# Patient Record
Sex: MALE | Race: WHITE | NOT HISPANIC OR LATINO | Employment: OTHER | URBAN - METROPOLITAN AREA
[De-identification: names, ages, dates, MRNs, and addresses within clinical notes are randomized per-mention and may not be internally consistent; named-entity substitution may affect disease eponyms.]

---

## 2023-02-02 PROBLEM — K43.2 INCISIONAL HERNIA, WITHOUT OBSTRUCTION OR GANGRENE: Status: ACTIVE | Noted: 2023-02-02

## 2023-02-02 PROBLEM — L40.4 PSORIASIS, GUTTATE: Status: ACTIVE | Noted: 2023-02-02

## 2023-02-02 PROBLEM — H00.019 HORDEOLUM EXTERNAL: Status: ACTIVE | Noted: 2023-02-02

## 2023-02-02 PROBLEM — M62.08 RECTUS DIASTASIS: Status: ACTIVE | Noted: 2023-02-02

## 2023-02-02 PROBLEM — M77.32 CALCANEAL SPUR OF LEFT FOOT: Status: ACTIVE | Noted: 2023-02-02

## 2023-02-02 PROBLEM — G25.0 TREMOR, ESSENTIAL: Status: ACTIVE | Noted: 2023-02-02

## 2023-02-02 PROBLEM — G57.02 PIRIFORMIS SYNDROME, LEFT: Status: ACTIVE | Noted: 2023-02-02

## 2023-02-02 PROBLEM — M79.673 FOOT PAIN: Status: ACTIVE | Noted: 2023-02-02

## 2023-02-02 PROBLEM — E78.5 HLD (HYPERLIPIDEMIA): Status: ACTIVE | Noted: 2023-02-02

## 2023-02-02 PROBLEM — N52.9 ED (ERECTILE DYSFUNCTION): Status: ACTIVE | Noted: 2023-02-02

## 2023-02-02 PROBLEM — K42.9 UMBILICAL HERNIA: Status: ACTIVE | Noted: 2023-02-02

## 2023-02-02 PROBLEM — M54.50 LUMBAR BACK PAIN: Status: ACTIVE | Noted: 2023-02-02

## 2023-02-02 PROBLEM — R12 HEARTBURN: Status: ACTIVE | Noted: 2023-02-02

## 2023-02-02 PROBLEM — E78.1 HYPERTRIGLYCERIDEMIA: Status: ACTIVE | Noted: 2023-02-02

## 2023-02-02 PROBLEM — M72.2 PLANTAR FASCIITIS, LEFT: Status: ACTIVE | Noted: 2023-02-02

## 2023-02-02 PROBLEM — S93.402A SPRAIN OF ANKLE, LEFT: Status: ACTIVE | Noted: 2023-02-02

## 2023-02-02 PROBLEM — R25.1 TREMOR: Status: ACTIVE | Noted: 2023-02-02

## 2023-02-02 RX ORDER — ATORVASTATIN CALCIUM 20 MG/1
1 TABLET, FILM COATED ORAL DAILY
COMMUNITY
Start: 2018-11-30 | End: 2023-03-16 | Stop reason: SDUPTHER

## 2023-02-02 RX ORDER — PROPRANOLOL HYDROCHLORIDE 60 MG/1
1 CAPSULE, EXTENDED RELEASE ORAL DAILY
COMMUNITY
Start: 2022-03-15 | End: 2023-03-16 | Stop reason: SDUPTHER

## 2023-02-02 RX ORDER — HYDROGEN PEROXIDE 3 %
SOLUTION, NON-ORAL MISCELLANEOUS
COMMUNITY
Start: 2018-11-30

## 2023-02-02 RX ORDER — DICLOFENAC SODIUM 75 MG/1
1 TABLET, DELAYED RELEASE ORAL DAILY
COMMUNITY
Start: 2022-10-04 | End: 2023-03-16 | Stop reason: SDUPTHER

## 2023-03-16 ENCOUNTER — OFFICE VISIT (OUTPATIENT)
Dept: PRIMARY CARE | Facility: CLINIC | Age: 68
End: 2023-03-16
Payer: MEDICARE

## 2023-03-16 VITALS
WEIGHT: 297 LBS | HEIGHT: 78 IN | TEMPERATURE: 97.7 F | RESPIRATION RATE: 18 BRPM | HEART RATE: 77 BPM | BODY MASS INDEX: 34.36 KG/M2 | DIASTOLIC BLOOD PRESSURE: 84 MMHG | SYSTOLIC BLOOD PRESSURE: 128 MMHG

## 2023-03-16 DIAGNOSIS — G25.0 TREMOR, ESSENTIAL: ICD-10-CM

## 2023-03-16 DIAGNOSIS — M72.2 PLANTAR FASCIITIS, LEFT: ICD-10-CM

## 2023-03-16 DIAGNOSIS — E78.5 HYPERLIPIDEMIA, UNSPECIFIED HYPERLIPIDEMIA TYPE: ICD-10-CM

## 2023-03-16 PROBLEM — M79.673 FOOT PAIN: Status: RESOLVED | Noted: 2023-02-02 | Resolved: 2023-03-16

## 2023-03-16 PROBLEM — H00.019 HORDEOLUM EXTERNAL: Status: RESOLVED | Noted: 2023-02-02 | Resolved: 2023-03-16

## 2023-03-16 PROBLEM — R12 HEARTBURN: Status: RESOLVED | Noted: 2023-02-02 | Resolved: 2023-03-16

## 2023-03-16 PROBLEM — S93.402A SPRAIN OF ANKLE, LEFT: Status: RESOLVED | Noted: 2023-02-02 | Resolved: 2023-03-16

## 2023-03-16 PROBLEM — M54.50 LUMBAR BACK PAIN: Status: RESOLVED | Noted: 2023-02-02 | Resolved: 2023-03-16

## 2023-03-16 PROBLEM — R25.1 TREMOR: Status: RESOLVED | Noted: 2023-02-02 | Resolved: 2023-03-16

## 2023-03-16 PROBLEM — K42.9 UMBILICAL HERNIA: Status: RESOLVED | Noted: 2023-02-02 | Resolved: 2023-03-16

## 2023-03-16 PROCEDURE — 1036F TOBACCO NON-USER: CPT | Performed by: FAMILY MEDICINE

## 2023-03-16 PROCEDURE — 1160F RVW MEDS BY RX/DR IN RCRD: CPT | Performed by: FAMILY MEDICINE

## 2023-03-16 PROCEDURE — 99214 OFFICE O/P EST MOD 30 MIN: CPT | Performed by: FAMILY MEDICINE

## 2023-03-16 PROCEDURE — 1159F MED LIST DOCD IN RCRD: CPT | Performed by: FAMILY MEDICINE

## 2023-03-16 PROCEDURE — 1157F ADVNC CARE PLAN IN RCRD: CPT | Performed by: FAMILY MEDICINE

## 2023-03-16 RX ORDER — DICLOFENAC SODIUM 75 MG/1
75 TABLET, DELAYED RELEASE ORAL DAILY
Qty: 90 TABLET | Refills: 1 | Status: SHIPPED | OUTPATIENT
Start: 2023-03-16 | End: 2023-03-16 | Stop reason: ALTCHOICE

## 2023-03-16 RX ORDER — PROPRANOLOL HYDROCHLORIDE 60 MG/1
60 CAPSULE, EXTENDED RELEASE ORAL DAILY
Qty: 90 CAPSULE | Refills: 1 | Status: SHIPPED | OUTPATIENT
Start: 2023-03-16 | End: 2023-03-21 | Stop reason: SDUPTHER

## 2023-03-16 RX ORDER — ATORVASTATIN CALCIUM 20 MG/1
20 TABLET, FILM COATED ORAL DAILY
Qty: 90 TABLET | Refills: 1 | Status: SHIPPED | OUTPATIENT
Start: 2023-03-16 | End: 2023-03-21 | Stop reason: SDUPTHER

## 2023-03-16 ASSESSMENT — PATIENT HEALTH QUESTIONNAIRE - PHQ9
2. FEELING DOWN, DEPRESSED OR HOPELESS: NOT AT ALL
1. LITTLE INTEREST OR PLEASURE IN DOING THINGS: NOT AT ALL
SUM OF ALL RESPONSES TO PHQ9 QUESTIONS 1 AND 2: 0

## 2023-03-16 NOTE — PROGRESS NOTES
Bandar Castellanos Jr. is a 67 y.o. male here today for 6 month follow up HLD and tremor.        HLD recheck -- Patient taking medications correctly.  No SE's of muscle pain or joint pain.  No CP, edema, myalgias.    Essential tremor-this has been stable with current medications.  Still with some trouble with writing.  Much improved.  Variable.      Moles - not changing but asking me to check his back to make sure there are no dangerous skin lesions.    Left plantar fasciitis -he previously saw Dr. Reyes in podiatry.  He says this has been pretty stable and he still wears the inserts in his shoes.  He occasionally uses topical Voltaren gel.  He is no longer taking the oral diclofenac.      Objective    Visit Vitals  /84   Pulse 77   Temp 36.5 °C (97.7 °F)   Resp 18       Physical Exam   General - Not in acute distress and cooperative.  Build & Nutrition - Well developed  Posture - Normal  Gait - Normal  Mental Status - alert and oriented x 3    Head - Normocephalic    Neck - Thyroid normal size    Eyes - Bilateral - Sclera clear and lids pink without edema or mass.      Skin - Warm and dry with no rashes on visible skin.  Patient has a number of pigmented skin lesions on his back but none have any dangerous signs to warrant a biopsy or dermatology referral.    Lungs - Clear to auscultation and normal breathing effort    Cardiovascular - RRR and no murmurs, rubs or thrill.    Peripheral Vascular - Bilateral - no edema present    Neuropsychiatric - normal mood and affect       Assessment      Assess/Plan SmartLinks: Diagnoses and all orders for this visit:  Hyperlipidemia, unspecified hyperlipidemia type  Plantar fasciitis, left  Tremor, essential    The patient's chronic conditions are stable.  I will refill his atorvastatin and propranolol at the previous doses.  Appropriate labs ordered or reviewed.  He can continue to use the Voltaren gel on his plantar fasciitis as needed.  No change in current treatment  regimen.  Make a follow up appointment with me for recheck in 6 months.

## 2023-03-21 DIAGNOSIS — G25.0 TREMOR, ESSENTIAL: ICD-10-CM

## 2023-03-21 DIAGNOSIS — E78.5 HYPERLIPIDEMIA, UNSPECIFIED HYPERLIPIDEMIA TYPE: ICD-10-CM

## 2023-03-21 RX ORDER — ATORVASTATIN CALCIUM 20 MG/1
20 TABLET, FILM COATED ORAL DAILY
Qty: 90 TABLET | Refills: 1 | Status: SHIPPED | OUTPATIENT
Start: 2023-03-21 | End: 2023-10-12 | Stop reason: SDUPTHER

## 2023-03-21 RX ORDER — PROPRANOLOL HYDROCHLORIDE 60 MG/1
60 CAPSULE, EXTENDED RELEASE ORAL DAILY
Qty: 90 CAPSULE | Refills: 1 | Status: SHIPPED | OUTPATIENT
Start: 2023-03-21 | End: 2023-10-12 | Stop reason: SDUPTHER

## 2023-04-14 ENCOUNTER — LAB (OUTPATIENT)
Dept: LAB | Facility: LAB | Age: 68
End: 2023-04-14
Payer: MEDICARE

## 2023-04-14 DIAGNOSIS — G25.0 TREMOR, ESSENTIAL: ICD-10-CM

## 2023-04-14 DIAGNOSIS — E78.5 HYPERLIPIDEMIA, UNSPECIFIED HYPERLIPIDEMIA TYPE: ICD-10-CM

## 2023-04-14 LAB
ALANINE AMINOTRANSFERASE (SGPT) (U/L) IN SER/PLAS: 34 U/L (ref 10–52)
ALBUMIN (G/DL) IN SER/PLAS: 4.5 G/DL (ref 3.4–5)
ALKALINE PHOSPHATASE (U/L) IN SER/PLAS: 59 U/L (ref 33–136)
ANION GAP IN SER/PLAS: 14 MMOL/L (ref 10–20)
ASPARTATE AMINOTRANSFERASE (SGOT) (U/L) IN SER/PLAS: 24 U/L (ref 9–39)
BILIRUBIN TOTAL (MG/DL) IN SER/PLAS: 1 MG/DL (ref 0–1.2)
CALCIUM (MG/DL) IN SER/PLAS: 9.7 MG/DL (ref 8.6–10.3)
CARBON DIOXIDE, TOTAL (MMOL/L) IN SER/PLAS: 28 MMOL/L (ref 21–32)
CHLORIDE (MMOL/L) IN SER/PLAS: 105 MMOL/L (ref 98–107)
CHOLESTEROL (MG/DL) IN SER/PLAS: 166 MG/DL (ref 0–199)
CHOLESTEROL IN HDL (MG/DL) IN SER/PLAS: 32.4 MG/DL
CHOLESTEROL/HDL RATIO: 5.1
CREATININE (MG/DL) IN SER/PLAS: 1.05 MG/DL (ref 0.5–1.3)
ERYTHROCYTE DISTRIBUTION WIDTH (RATIO) BY AUTOMATED COUNT: 13.3 % (ref 11.5–14.5)
ERYTHROCYTE MEAN CORPUSCULAR HEMOGLOBIN CONCENTRATION (G/DL) BY AUTOMATED: 33.3 G/DL (ref 32–36)
ERYTHROCYTE MEAN CORPUSCULAR VOLUME (FL) BY AUTOMATED COUNT: 90 FL (ref 80–100)
ERYTHROCYTES (10*6/UL) IN BLOOD BY AUTOMATED COUNT: 5.15 X10E12/L (ref 4.5–5.9)
GFR MALE: 77 ML/MIN/1.73M2
GLUCOSE (MG/DL) IN SER/PLAS: 94 MG/DL (ref 74–99)
HEMATOCRIT (%) IN BLOOD BY AUTOMATED COUNT: 46.5 % (ref 41–52)
HEMOGLOBIN (G/DL) IN BLOOD: 15.5 G/DL (ref 13.5–17.5)
LDL: 68 MG/DL (ref 0–99)
LEUKOCYTES (10*3/UL) IN BLOOD BY AUTOMATED COUNT: 5.1 X10E9/L (ref 4.4–11.3)
NON HDL CHOLESTEROL: 134 MG/DL
PLATELETS (10*3/UL) IN BLOOD AUTOMATED COUNT: 236 X10E9/L (ref 150–450)
POTASSIUM (MMOL/L) IN SER/PLAS: 4.9 MMOL/L (ref 3.5–5.3)
PROTEIN TOTAL: 6.8 G/DL (ref 6.4–8.2)
SODIUM (MMOL/L) IN SER/PLAS: 142 MMOL/L (ref 136–145)
TRIGLYCERIDE (MG/DL) IN SER/PLAS: 330 MG/DL (ref 0–149)
UREA NITROGEN (MG/DL) IN SER/PLAS: 17 MG/DL (ref 6–23)
VLDL: 66 MG/DL (ref 0–40)

## 2023-04-14 PROCEDURE — 36415 COLL VENOUS BLD VENIPUNCTURE: CPT

## 2023-04-14 PROCEDURE — 85027 COMPLETE CBC AUTOMATED: CPT

## 2023-04-14 PROCEDURE — 80053 COMPREHEN METABOLIC PANEL: CPT

## 2023-04-14 PROCEDURE — 80061 LIPID PANEL: CPT

## 2023-04-17 NOTE — RESULT ENCOUNTER NOTE
Inform patient of normal results of all recent labs.   Even though some of the lab values may fall outside of the normal range, I do not feel they are clinically concerning or relevant at this time.

## 2023-04-18 ENCOUNTER — TELEPHONE (OUTPATIENT)
Dept: PRIMARY CARE | Facility: CLINIC | Age: 68
End: 2023-04-18
Payer: MEDICARE

## 2023-04-18 NOTE — TELEPHONE ENCOUNTER
----- Message from René Mckenna MD sent at 4/17/2023  7:15 AM EDT -----  Inform patient of normal results of all recent labs.   Even though some of the lab values may fall outside of the normal range, I do not feel they are clinically concerning or relevant at this time.

## 2023-07-12 ENCOUNTER — OFFICE VISIT (OUTPATIENT)
Dept: PRIMARY CARE | Facility: CLINIC | Age: 68
End: 2023-07-12
Payer: MEDICARE

## 2023-07-12 VITALS
BODY MASS INDEX: 34.71 KG/M2 | HEIGHT: 78 IN | WEIGHT: 300 LBS | HEART RATE: 74 BPM | RESPIRATION RATE: 20 BRPM | SYSTOLIC BLOOD PRESSURE: 104 MMHG | TEMPERATURE: 97 F | DIASTOLIC BLOOD PRESSURE: 72 MMHG | OXYGEN SATURATION: 96 %

## 2023-07-12 DIAGNOSIS — I80.3 PHLEBITIS OF LEG, RIGHT: Primary | ICD-10-CM

## 2023-07-12 PROBLEM — M47.817 LUMBOSACRAL SPONDYLOSIS WITHOUT MYELOPATHY: Status: ACTIVE | Noted: 2020-01-07

## 2023-07-12 PROCEDURE — 1157F ADVNC CARE PLAN IN RCRD: CPT | Performed by: FAMILY MEDICINE

## 2023-07-12 PROCEDURE — 1036F TOBACCO NON-USER: CPT | Performed by: FAMILY MEDICINE

## 2023-07-12 PROCEDURE — 1159F MED LIST DOCD IN RCRD: CPT | Performed by: FAMILY MEDICINE

## 2023-07-12 PROCEDURE — 1160F RVW MEDS BY RX/DR IN RCRD: CPT | Performed by: FAMILY MEDICINE

## 2023-07-12 PROCEDURE — 99214 OFFICE O/P EST MOD 30 MIN: CPT | Performed by: FAMILY MEDICINE

## 2023-07-12 NOTE — PROGRESS NOTES
"Bandar Castellanos is a 67 y.o. male here today for   Chief Complaint   Patient presents with    Hospital Follow-up      [facility] for phlebitis of right distal leg.  US showed thrombophlebitis of distal greater saphenous vein.  Was put on Keflex and IB because the ER doctor thought there could also be an overlying cellulitis.  Foot improved now.  The swelling of calf has improved.  He says the main area of tenderness was directly over the medial malleolus but this is no longer tender.  He has no previous history of phlebitis or thrombosis.  There was no injury or other conditions which would increase his risk for phlebitis.    Has also been getting some possible hives of buttocks and upper legs over the 3 months.   He says it has been very sporadic and it may be linked to mowing the lawn.  When he gets these lesions they last for a day or 2 and then resolve.  Today he has not of these lesions.  Was told it may be a reaction to Voltaren and IB together by urgent care doctor.          Current Outpatient Medications:     atorvastatin (Lipitor) 20 mg tablet, Take 1 tablet (20 mg) by mouth once daily., Disp: 90 tablet, Rfl: 1    esomeprazole (NexIUM) 20 mg DR capsule, Take by mouth., Disp: , Rfl:     magnesium oxide-Mg AA chelate 300 mg capsule, Take by mouth., Disp: , Rfl:     multivitamin capsule, Take by mouth., Disp: , Rfl:     propranolol LA (Inderal LA) 60 mg 24 hr capsule, Take 1 capsule (60 mg) by mouth once daily., Disp: 90 capsule, Rfl: 1    Patient Active Problem List   Diagnosis    Calcaneal spur of left foot    ED (erectile dysfunction)    HLD (hyperlipidemia)    Hypertriglyceridemia    Incisional hernia, without obstruction or gangrene    Piriformis syndrome, left    Psoriasis, guttate    Tremor, essential    Rectus diastasis    Lumbosacral spondylosis without myelopathy              Objective    Visit Vitals  /72   Pulse 74   Temp 36.1 °C (97 °F)   Resp 20   Ht 2.007 m (6' 7\")   Wt 136 kg (300 lb)   " SpO2 96%   BMI 33.80 kg/m²     Body mass index is 33.8 kg/m².     Physical Exam   Right leg-patient shows me a picture from a few days ago which shows an area of erythema around the medial malleolus.  This has now completely resolved.  There is no tenderness on exam.  There is no significant calf swelling or tenderness.    Assessment    1. Phlebitis of leg, right     The ultrasound obtained in the emergency room did not show a DVT but showed phlebitis of the distal greater saphenous vein.  The patient was not started on anticoagulants.  It is now improved significantly.  I recommend that he continue with the same measures from the emergency room and monitor closely.  If there is any worsening or recurrence of symptoms he should call us right away or go to the emergency room.     The patient also is reporting occasional hives or some type of transient rash as above.  I recommend that he monitor and make an appointment when he has the rash so I can further assess it.

## 2023-09-12 ENCOUNTER — LAB (OUTPATIENT)
Dept: LAB | Facility: LAB | Age: 68
End: 2023-09-12
Payer: MEDICARE

## 2023-09-12 ENCOUNTER — OFFICE VISIT (OUTPATIENT)
Dept: PRIMARY CARE | Facility: CLINIC | Age: 68
End: 2023-09-12
Payer: MEDICARE

## 2023-09-12 VITALS
TEMPERATURE: 97.1 F | RESPIRATION RATE: 18 BRPM | WEIGHT: 289 LBS | SYSTOLIC BLOOD PRESSURE: 130 MMHG | DIASTOLIC BLOOD PRESSURE: 82 MMHG | BODY MASS INDEX: 33.44 KG/M2 | HEIGHT: 78 IN | HEART RATE: 96 BPM

## 2023-09-12 DIAGNOSIS — M62.08 RECTUS DIASTASIS: ICD-10-CM

## 2023-09-12 DIAGNOSIS — Z12.5 SCREENING FOR PROSTATE CANCER: ICD-10-CM

## 2023-09-12 DIAGNOSIS — Z00.00 ROUTINE GENERAL MEDICAL EXAMINATION AT HEALTH CARE FACILITY: Primary | ICD-10-CM

## 2023-09-12 DIAGNOSIS — K43.2 INCISIONAL HERNIA, WITHOUT OBSTRUCTION OR GANGRENE: ICD-10-CM

## 2023-09-12 DIAGNOSIS — Z11.4 SCREENING FOR HIV WITHOUT PRESENCE OF RISK FACTORS: ICD-10-CM

## 2023-09-12 DIAGNOSIS — Z23 ENCOUNTER FOR IMMUNIZATION: ICD-10-CM

## 2023-09-12 DIAGNOSIS — Z11.59 NEED FOR HEPATITIS C SCREENING TEST: ICD-10-CM

## 2023-09-12 PROBLEM — E78.2 MIXED HYPERLIPIDEMIA: Status: ACTIVE | Noted: 2023-02-02

## 2023-09-12 LAB — PROSTATE SPECIFIC ANTIGEN,SCREEN: 0.82 NG/ML (ref 0–4)

## 2023-09-12 PROCEDURE — 1160F RVW MEDS BY RX/DR IN RCRD: CPT | Performed by: FAMILY MEDICINE

## 2023-09-12 PROCEDURE — 86803 HEPATITIS C AB TEST: CPT

## 2023-09-12 PROCEDURE — G0439 PPPS, SUBSEQ VISIT: HCPCS | Performed by: FAMILY MEDICINE

## 2023-09-12 PROCEDURE — 90662 IIV NO PRSV INCREASED AG IM: CPT | Performed by: FAMILY MEDICINE

## 2023-09-12 PROCEDURE — 1159F MED LIST DOCD IN RCRD: CPT | Performed by: FAMILY MEDICINE

## 2023-09-12 PROCEDURE — 1157F ADVNC CARE PLAN IN RCRD: CPT | Performed by: FAMILY MEDICINE

## 2023-09-12 PROCEDURE — 1036F TOBACCO NON-USER: CPT | Performed by: FAMILY MEDICINE

## 2023-09-12 PROCEDURE — 87389 HIV-1 AG W/HIV-1&-2 AB AG IA: CPT

## 2023-09-12 PROCEDURE — 99213 OFFICE O/P EST LOW 20 MIN: CPT | Performed by: FAMILY MEDICINE

## 2023-09-12 PROCEDURE — 1170F FXNL STATUS ASSESSED: CPT | Performed by: FAMILY MEDICINE

## 2023-09-12 PROCEDURE — G0008 ADMIN INFLUENZA VIRUS VAC: HCPCS | Performed by: FAMILY MEDICINE

## 2023-09-12 PROCEDURE — G0103 PSA SCREENING: HCPCS

## 2023-09-12 PROCEDURE — G0444 DEPRESSION SCREEN ANNUAL: HCPCS | Performed by: FAMILY MEDICINE

## 2023-09-12 PROCEDURE — 36415 COLL VENOUS BLD VENIPUNCTURE: CPT

## 2023-09-12 ASSESSMENT — ACTIVITIES OF DAILY LIVING (ADL)
DRESSING: INDEPENDENT
DOING_HOUSEWORK: INDEPENDENT
MANAGING_FINANCES: INDEPENDENT
GROCERY_SHOPPING: INDEPENDENT
BATHING: INDEPENDENT
TAKING_MEDICATION: INDEPENDENT

## 2023-09-12 ASSESSMENT — PATIENT HEALTH QUESTIONNAIRE - PHQ9
SUM OF ALL RESPONSES TO PHQ9 QUESTIONS 1 AND 2: 0
1. LITTLE INTEREST OR PLEASURE IN DOING THINGS: NOT AT ALL
2. FEELING DOWN, DEPRESSED OR HOPELESS: NOT AT ALL

## 2023-09-12 NOTE — PROGRESS NOTES
History Of Present Illness  Bandar Castellanos is a 67 y.o. male presenting for a Medicare Annual Wellness Exam.  Patient is here for a periodic health exam.  I reviewed previous preventative health measures including screening tests, immunizations and labs.  I reviewed screenings administered by my staff today.    Umbilical hernia - he saw Dr. Buck last year and he ordered a CT but the patient never got it done.  I reviewed the surgeons note and he definitely wanted to get a CT done to help decide if and when surgery should be done.  The patient says that he still has bulging around the umbilicus with straining and he is not sure if it is getting worse or bigger.  No episodes of incarceration symptoms on review.     PREVIOUS PREVENTATIVE HEALTH  Colonoscopy : No  Cologuard : Yes    Date: 8/23/2022  Prostate cancer screening with PSA : Yes    Date: 8/23/2022  Hepatitis C Antibody : No  Prevnar : Yes    Date: 6/22/2020 Prevnar 13  Shingrix : Yes    Date: Times 2/20/2022  Tdap within 10 years : Yes  Yearly Flu Shot : Yes    CURRENT FINDINGS  Falls : No  Problems with ADL's :  No  Healthy Diet : Yes  Exercise :  No  Vision or Hearing Problems : No  Depression Issues : No  Alcohol Use : No  Tobacco Use : No         Past Medical History  Patient Active Problem List   Diagnosis    Calcaneal spur of left foot    ED (erectile dysfunction)    Mixed hyperlipidemia    Hypertriglyceridemia    Incisional hernia, without obstruction or gangrene    Piriformis syndrome, left    Psoriasis, guttate    Tremor, essential    Rectus diastasis    Lumbosacral spondylosis without myelopathy       Past Surgical History:   Procedure Laterality Date    OTHER SURGICAL HISTORY  11/30/2018    Colonoscopy        Current Outpatient Medications:     atorvastatin (Lipitor) 20 mg tablet, Take 1 tablet (20 mg) by mouth once daily., Disp: 90 tablet, Rfl: 1    esomeprazole (NexIUM) 20 mg DR capsule, Take by mouth., Disp: , Rfl:     magnesium oxide-Mg AA  chelate 300 mg capsule, Take by mouth., Disp: , Rfl:     multivitamin capsule, Take by mouth., Disp: , Rfl:     propranolol LA (Inderal LA) 60 mg 24 hr capsule, Take 1 capsule (60 mg) by mouth once daily., Disp: 90 capsule, Rfl: 1   Immunization History   Administered Date(s) Administered    Flu vaccine, quadrivalent, high-dose, preservative free, age 65y+ (FLUZONE) 09/12/2023    Influenza, seasonal, injectable 12/21/2020    Pfizer COVID-19 vaccine, bivalent, age 12 years and older (30 mcg/0.3 mL) 11/25/2022    Pfizer Purple Cap SARS-CoV-2 03/05/2021, 03/26/2021, 10/07/2021    Pneumococcal conjugate vaccine, 13-valent (PREVNAR 13) 06/22/2020    Pneumococcal polysaccharide vaccine, 23-valent, age 2 years and older (PNEUMOVAX 23) 11/02/2017    SARS-CoV-2, Unspecified 05/12/2022    Td vaccine, age 7 years and older (TDVAX) 11/02/2017    Zoster vaccine, recombinant, adult (SHINGRIX) 12/21/2020, 03/14/2022        Social History  Social History     Socioeconomic History    Marital status:      Spouse name: Not on file    Number of children: Not on file    Years of education: Not on file    Highest education level: Not on file   Occupational History    Not on file   Tobacco Use    Smoking status: Never    Smokeless tobacco: Never   Substance and Sexual Activity    Alcohol use: Not on file    Drug use: Not on file    Sexual activity: Not on file   Other Topics Concern    Not on file   Social History Narrative    Not on file     Social Determinants of Health     Financial Resource Strain: Not on file   Food Insecurity: Not on file   Transportation Needs: Not on file   Physical Activity: Not on file   Stress: Not on file   Social Connections: Not on file   Intimate Partner Violence: Not on file   Housing Stability: Not on file        has no history on file for alcohol use.    reports that he has never smoked. He has never used smokeless tobacco.    has no history on file for drug use.          "  Allergies  Fenofibrate      Physical Exam   height is 2.007 m (6' 7\") and weight is 131 kg (289 lb). His temperature is 36.2 °C (97.1 °F). His blood pressure is 130/82 and his pulse is 96. His respiration is 18.    Physical Exam  Vitals and nursing note reviewed.   Constitutional:       General: He is not in acute distress.     Appearance: Normal appearance.   HENT:      Head: Normocephalic and atraumatic.      Right Ear: Tympanic membrane, ear canal and external ear normal.      Left Ear: Tympanic membrane, ear canal and external ear normal.      Nose: Nose normal.      Mouth/Throat:      Mouth: Mucous membranes are moist.      Pharynx: Oropharynx is clear.   Eyes:      Extraocular Movements: Extraocular movements intact.      Conjunctiva/sclera: Conjunctivae normal.      Pupils: Pupils are equal, round, and reactive to light.   Cardiovascular:      Rate and Rhythm: Normal rate and regular rhythm.      Pulses: Normal pulses.      Heart sounds: Normal heart sounds. No murmur heard.     No friction rub. No gallop.   Pulmonary:      Effort: Pulmonary effort is normal. No respiratory distress.      Breath sounds: Normal breath sounds.   Abdominal:      General: Abdomen is flat. Bowel sounds are normal. There is no distension.      Palpations: Abdomen is soft.      Tenderness: There is no abdominal tenderness.   Musculoskeletal:         General: Normal range of motion.      Cervical back: Normal range of motion and neck supple.   Lymphadenopathy:      Cervical: No cervical adenopathy.   Skin:     General: Skin is warm and dry.      Findings: No lesion or rash.   Neurological:      General: No focal deficit present.      Mental Status: He is alert. Mental status is at baseline.   Psychiatric:         Mood and Affect: Mood normal.         Behavior: Behavior normal.         Thought Content: Thought content normal.         Judgment: Judgment normal.        Abdomen-there is a umbilical hernia in place with no evidence of " contents currently.  There is mild tenderness to palpation.         Assessment      1. Routine general medical examination at health care facility  1 Year Follow Up In [facility] - PCP - Wellness Exam      2. Encounter for immunization  Flu vaccine, quadrivalent, high-dose, preservative free, age 65y+ (FLUZONE)      3. Incisional hernia, without obstruction or gangrene  Referral to General Surgery   Patient has a umbilical hernia and diastases recti.  I recommend that he see a general surgeon for recheck and to get advice surgical repair.  We discussed the danger of waiting and that the hernia will likely progress and become more difficult to surgically correct over time.     4. Rectus diastasis  Referral to General Surgery      5. Need for hepatitis C screening test  Hepatitis C antibody      6. Screening for HIV without presence of risk factors  HIV-1 and HIV-2 antibodies      7. Screening for prostate cancer  Prostate Specific Antigen, Screen           I recommend regular exercise, balanced diet, regular dental exams, and healthy habits.  Patient denies depression sxs.  Patient is able to perform all ADL's without assistance.  I reminded patient to get yearly eye exams with glaucoma screening.  I recommend to eat plenty of plant foods (such as whole-grain products, fruits, and vegetables) and a moderate amount of lean and low-fat, animal-based food (meat and dairy products).  When shopping, choose lean meats, fish, and poultry. I recommend to try to get regular aerobic exercise.  I recommend a yearly flu shot in the fall and I recommend a yearly wellness exam.      Ordered today:  PSA, Hepatitis C Antibody, HIV Screen             René Mckenna MD

## 2023-09-13 ENCOUNTER — TELEPHONE (OUTPATIENT)
Dept: PRIMARY CARE | Facility: CLINIC | Age: 68
End: 2023-09-13
Payer: MEDICARE

## 2023-09-13 LAB
HEPATITIS C VIRUS AB PRESENCE IN SERUM: NONREACTIVE
HIV 1/ 2 AG/AB SCREEN: NONREACTIVE

## 2023-10-12 ENCOUNTER — OFFICE VISIT (OUTPATIENT)
Dept: PRIMARY CARE | Facility: CLINIC | Age: 68
End: 2023-10-12
Payer: MEDICARE

## 2023-10-12 VITALS
HEART RATE: 92 BPM | TEMPERATURE: 97.3 F | HEIGHT: 78 IN | WEIGHT: 290 LBS | DIASTOLIC BLOOD PRESSURE: 78 MMHG | RESPIRATION RATE: 18 BRPM | BODY MASS INDEX: 33.55 KG/M2 | SYSTOLIC BLOOD PRESSURE: 120 MMHG

## 2023-10-12 DIAGNOSIS — E78.2 MIXED HYPERLIPIDEMIA: Primary | ICD-10-CM

## 2023-10-12 DIAGNOSIS — G25.0 TREMOR, ESSENTIAL: ICD-10-CM

## 2023-10-12 DIAGNOSIS — E78.1 HYPERTRIGLYCERIDEMIA: ICD-10-CM

## 2023-10-12 PROCEDURE — 1159F MED LIST DOCD IN RCRD: CPT | Performed by: FAMILY MEDICINE

## 2023-10-12 PROCEDURE — 1160F RVW MEDS BY RX/DR IN RCRD: CPT | Performed by: FAMILY MEDICINE

## 2023-10-12 PROCEDURE — 1036F TOBACCO NON-USER: CPT | Performed by: FAMILY MEDICINE

## 2023-10-12 PROCEDURE — 99213 OFFICE O/P EST LOW 20 MIN: CPT | Performed by: FAMILY MEDICINE

## 2023-10-12 RX ORDER — MAGNESIUM 250 MG
TABLET ORAL 2 TIMES DAILY
COMMUNITY

## 2023-10-12 RX ORDER — IBUPROFEN 400 MG/1
400 TABLET ORAL EVERY 6 HOURS PRN
COMMUNITY
End: 2024-04-09 | Stop reason: ALTCHOICE

## 2023-10-12 RX ORDER — PROPRANOLOL HYDROCHLORIDE 60 MG/1
60 CAPSULE, EXTENDED RELEASE ORAL DAILY
Qty: 90 CAPSULE | Refills: 1 | Status: SHIPPED | OUTPATIENT
Start: 2023-10-12 | End: 2024-04-09 | Stop reason: ALTCHOICE

## 2023-10-12 RX ORDER — GLUCOSAM/CHONDRO/HERB 149/HYAL 750-100 MG
TABLET ORAL
COMMUNITY

## 2023-10-12 RX ORDER — ATORVASTATIN CALCIUM 20 MG/1
20 TABLET, FILM COATED ORAL DAILY
Qty: 90 TABLET | Refills: 1 | Status: SHIPPED | OUTPATIENT
Start: 2023-10-12 | End: 2024-04-09 | Stop reason: SDUPTHER

## 2023-10-12 NOTE — PROGRESS NOTES
"Bandar Castellanos is a 67 y.o. male here today for 6 month follow up.   Chief Complaint   Patient presents with    Tremors    Hyperlipidemia        HPI   HLD recheck -- Patient taking medications correctly.  No SE's of muscle pain or joint pain.  No CP, edema, myalgias.    Recheck tremors- Well controlled with propranolol.  No SE's.     Had right leg phlebitis in July 2023 - no DVT so no anticoagulant started.  Had been taking IB for this and sxs have completely resolved.  He is no longer taking ibuprofen daily and he stopped it about 1 month ago.      Current Outpatient Medications:     esomeprazole (NexIUM) 20 mg DR capsule, Take by mouth., Disp: , Rfl:     ibuprofen (IBU) 400 mg tablet, Take 1 tablet (400 mg) by mouth every 6 hours if needed for moderate pain (4 - 6)., Disp: , Rfl:     multivitamin capsule, Take by mouth., Disp: , Rfl:     omega 3-dha-epa-fish oil (Fish OiL) 1,000 mg (120 mg-180 mg) capsule, Take by mouth., Disp: , Rfl:     atorvastatin (Lipitor) 20 mg tablet, Take 1 tablet (20 mg) by mouth once daily., Disp: 90 tablet, Rfl: 1    magnesium 250 mg tablet, Take by mouth 2 times a day., Disp: , Rfl:     propranolol LA (Inderal LA) 60 mg 24 hr capsule, Take 1 capsule (60 mg) by mouth once daily., Disp: 90 capsule, Rfl: 1    Patient Active Problem List   Diagnosis    Calcaneal spur of left foot    ED (erectile dysfunction)    Mixed hyperlipidemia    Hypertriglyceridemia    Incisional hernia, without obstruction or gangrene    Piriformis syndrome, left    Psoriasis, guttate    Tremor, essential    Rectus diastasis    Lumbosacral spondylosis without myelopathy         No results found for this or any previous visit (from the past 672 hour(s)).     Objective    Visit Vitals  /78   Pulse 92   Temp 36.3 °C (97.3 °F)   Resp 18   Ht 2.007 m (6' 7\")   Wt 132 kg (290 lb)   BMI 32.67 kg/m²     Body mass index is 32.67 kg/m².     Physical Exam   General - Not in acute distress and cooperative.  Build " & Nutrition - Well developed  Posture - Normal  Gait - Normal  Mental Status - alert and oriented x 3    Head - Normocephalic    Neck - Thyroid normal size    Eyes - Bilateral - Sclera clear and lids pink without edema or mass.      Skin - Warm and dry with no rashes on visible skin    Lungs - Clear to auscultation and normal breathing effort    Cardiovascular - RRR and no murmurs, rubs or thrill.    Peripheral Vascular - Bilateral - no edema present    Neuropsychiatric - normal mood and affect        Assessment    1. Mixed hyperlipidemia  atorvastatin (Lipitor) 20 mg tablet   Condition well controlled.  No change in current treatment regimen.  Refill given of current medication.  Appropriate labs ordered or reviewed.  Make a follow up appointment with me for recheck in 6 months.     2. Tremor, essential  propranolol LA (Inderal LA) 60 mg 24 hr capsule   Condition well controlled.  No change in current treatment regimen.  Refill given of current medication.  Make a follow up appointment with me for recheck in 6 months.     3. Hypertriglyceridemia  atorvastatin (Lipitor) 20 mg tablet   Condition well controlled.  No change in current treatment regimen.  Refill given of current medication.  Appropriate labs ordered or reviewed.  Make a follow up appointment with me for recheck in 6 months.

## 2023-10-19 ENCOUNTER — APPOINTMENT (OUTPATIENT)
Dept: SURGERY | Facility: CLINIC | Age: 68
End: 2023-10-19
Payer: MEDICARE

## 2024-04-09 ENCOUNTER — OFFICE VISIT (OUTPATIENT)
Dept: PRIMARY CARE | Facility: CLINIC | Age: 69
End: 2024-04-09
Payer: MEDICARE

## 2024-04-09 VITALS
SYSTOLIC BLOOD PRESSURE: 128 MMHG | HEART RATE: 86 BPM | TEMPERATURE: 97 F | WEIGHT: 302 LBS | RESPIRATION RATE: 20 BRPM | BODY MASS INDEX: 34.94 KG/M2 | DIASTOLIC BLOOD PRESSURE: 90 MMHG | HEIGHT: 78 IN

## 2024-04-09 DIAGNOSIS — D22.9 MULTIPLE NEVI: ICD-10-CM

## 2024-04-09 DIAGNOSIS — R13.19 ESOPHAGEAL DYSPHAGIA: ICD-10-CM

## 2024-04-09 DIAGNOSIS — E78.1 HYPERTRIGLYCERIDEMIA: ICD-10-CM

## 2024-04-09 DIAGNOSIS — G25.0 TREMOR, ESSENTIAL: ICD-10-CM

## 2024-04-09 DIAGNOSIS — K21.9 GASTROESOPHAGEAL REFLUX DISEASE WITHOUT ESOPHAGITIS: Primary | ICD-10-CM

## 2024-04-09 DIAGNOSIS — E78.2 MIXED HYPERLIPIDEMIA: ICD-10-CM

## 2024-04-09 PROCEDURE — 1159F MED LIST DOCD IN RCRD: CPT | Performed by: FAMILY MEDICINE

## 2024-04-09 PROCEDURE — 99214 OFFICE O/P EST MOD 30 MIN: CPT | Performed by: FAMILY MEDICINE

## 2024-04-09 PROCEDURE — G2211 COMPLEX E/M VISIT ADD ON: HCPCS | Performed by: FAMILY MEDICINE

## 2024-04-09 PROCEDURE — 1160F RVW MEDS BY RX/DR IN RCRD: CPT | Performed by: FAMILY MEDICINE

## 2024-04-09 PROCEDURE — 1157F ADVNC CARE PLAN IN RCRD: CPT | Performed by: FAMILY MEDICINE

## 2024-04-09 PROCEDURE — 3074F SYST BP LT 130 MM HG: CPT | Performed by: FAMILY MEDICINE

## 2024-04-09 PROCEDURE — 1036F TOBACCO NON-USER: CPT | Performed by: FAMILY MEDICINE

## 2024-04-09 PROCEDURE — 3080F DIAST BP >= 90 MM HG: CPT | Performed by: FAMILY MEDICINE

## 2024-04-09 RX ORDER — PRIMIDONE 50 MG/1
25 TABLET ORAL NIGHTLY
Qty: 15 TABLET | Refills: 6 | Status: SHIPPED | OUTPATIENT
Start: 2024-04-09

## 2024-04-09 RX ORDER — ATORVASTATIN CALCIUM 20 MG/1
20 TABLET, FILM COATED ORAL DAILY
Qty: 90 TABLET | Refills: 1 | Status: CANCELLED | OUTPATIENT
Start: 2024-04-09

## 2024-04-09 RX ORDER — ATORVASTATIN CALCIUM 20 MG/1
20 TABLET, FILM COATED ORAL DAILY
Qty: 90 TABLET | Refills: 1 | Status: SHIPPED | OUTPATIENT
Start: 2024-04-09

## 2024-04-09 NOTE — PROGRESS NOTES
Bandar Castellanos is a 68 y.o. male here today for   Chief Complaint   Patient presents with    Hyperlipidemia        Hyperlipidemia  This is a chronic problem. The current episode started more than 1 year ago.      HLD recheck -- Patient taking medications correctly.  No SE's of muscle pain or joint pain.  No CP, edema, myalgias.      Recheck Tremor - Condition not well controlled with Propranolol.  It does seem to help but not as well controlled as he would like.  He asks if there are other options to try.  He did see a neurologist some years ago for this and was diagnosed with a essential tremor.    He has lots of moles.  Wife says many on back and maybe changing.  FH of skin cancer in dad - unsure of type.      He takes Nexium every 2-3 days OTC prn.  No sxs if he takes it.  He has had a few episodes of food sticking.  He says there has been many times when food has gotten stuck and he nearly had to go to the emergency room.  He denies any severe epigastric pain or chest pain.  He has noticed that he needs to eat smaller bites than he used to.          Current Outpatient Medications:     esomeprazole (NexIUM) 20 mg DR capsule, Take by mouth., Disp: , Rfl:     magnesium 250 mg tablet, Take by mouth 2 times a day., Disp: , Rfl:     multivitamin capsule, Take by mouth., Disp: , Rfl:     omega 3-dha-epa-fish oil (Fish OiL) 1,000 mg (120 mg-180 mg) capsule, Take by mouth., Disp: , Rfl:     atorvastatin (Lipitor) 20 mg tablet, Take 1 tablet (20 mg) by mouth once daily., Disp: 90 tablet, Rfl: 1    primidone (Mysoline) 50 mg tablet, Take 0.5 tablets (25 mg) by mouth once daily at bedtime., Disp: 15 tablet, Rfl: 6    Patient Active Problem List   Diagnosis    Calcaneal spur of left foot    ED (erectile dysfunction)    Mixed hyperlipidemia    Hypertriglyceridemia    Incisional hernia, without obstruction or gangrene    Piriformis syndrome, left    Psoriasis, guttate    Tremor, essential    Rectus diastasis    Lumbosacral  "spondylosis without myelopathy    Sebaceous cyst    Obesity, unspecified    Primary hypertension    Esophageal dysphagia    Gastroesophageal reflux disease without esophagitis    Multiple nevi         No results found for this or any previous visit (from the past 672 hour(s)).     Objective    Visit Vitals    Visit Vitals  /90   Pulse 86   Temp 36.1 °C (97 °F)   Resp 20   Ht 2.007 m (6' 7\")   Wt 137 kg (302 lb)   BMI 34.02 kg/m²   Smoking Status Former   BSA 2.76 m²       Body mass index is 34.02 kg/m².     Physical Exam   General - Not in acute distress and cooperative.  Build & Nutrition - Well developed  Posture - Normal  Gait - Normal  Mental Status - alert and oriented x 3    Head - Normocephalic    Neck - Thyroid normal size    Eyes - Bilateral - Sclera clear and lids pink without edema or mass.      Skin -patient has numerous nevi of his back and a few of his chest and abdomen.    Lungs - Clear to auscultation and normal breathing effort    Cardiovascular - RRR and no murmurs, rubs or thrill.    Peripheral Vascular - Bilateral - no edema present    Neuropsychiatric - normal mood and affect        Assessment    1. Gastroesophageal reflux disease without esophagitis  Referral to Gastroenterology   He has a long history of reflux for which she takes over-the-counter Nexium.  He is now having some episodes of food sticking as above.  We discussed this may mean that he is developing a esophageal stricture and I will refer him to gastroenterology for further evaluation.     2. Mixed hyperlipidemia  atorvastatin (Lipitor) 20 mg tablet, Comprehensive Metabolic Panel, CBC, Lipid Panel   Condition well controlled.  No change in current treatment regimen.  Refill given of current medication.  Appropriate labs ordered or reviewed.  Make a follow up appointment with me for recheck in 6 months.       3. Hypertriglyceridemia  atorvastatin (Lipitor) 20 mg tablet, Comprehensive Metabolic Panel, CBC, Lipid Panel "   Condition well controlled.  No change in current treatment regimen.  Refill given of current medication.  Appropriate labs ordered or reviewed.  Make a follow up appointment with me for recheck in 6 months.       4. Esophageal dysphagia  Referral to Gastroenterology      5. Tremor, essential  primidone (Mysoline) 50 mg tablet   Suboptimal control of condition.  Will modify treatment by changing from propranolol to primidone.  He will take primidone 25 mg at bedtime.  If he feels like things are not well-controlled in 1 month he should make a follow-up appointment to discuss further.     6. Multiple nevi  Referral to Dermatology   He does have multiple nevi of his trunk and none have an immediately dangerous appearance.  Since there is a family history of skin cancer I would like him to have a full body skin exam and I placed a referral to dermatology.         Orders Placed This Encounter      atorvastatin (Lipitor) 20 mg tablet      primidone (Mysoline) 50 mg tablet       Orders Placed This Encounter   Procedures    Comprehensive Metabolic Panel    CBC    Lipid Panel    Referral to Gastroenterology    Referral to Dermatology        New Medications Ordered This Visit   Medications    primidone (Mysoline) 50 mg tablet     Sig: Take 0.5 tablets (25 mg) by mouth once daily at bedtime.     Dispense:  15 tablet     Refill:  6    atorvastatin (Lipitor) 20 mg tablet     Sig: Take 1 tablet (20 mg) by mouth once daily.     Dispense:  90 tablet     Refill:  1

## 2024-04-15 ENCOUNTER — LAB (OUTPATIENT)
Dept: LAB | Facility: LAB | Age: 69
End: 2024-04-15
Payer: MEDICARE

## 2024-04-15 DIAGNOSIS — E78.1 HYPERTRIGLYCERIDEMIA: ICD-10-CM

## 2024-04-15 DIAGNOSIS — E78.2 MIXED HYPERLIPIDEMIA: ICD-10-CM

## 2024-04-15 LAB
ALBUMIN SERPL BCP-MCNC: 4.2 G/DL (ref 3.4–5)
ALP SERPL-CCNC: 53 U/L (ref 33–136)
ALT SERPL W P-5'-P-CCNC: 27 U/L (ref 10–52)
ANION GAP SERPL CALC-SCNC: 11 MMOL/L (ref 10–20)
AST SERPL W P-5'-P-CCNC: 18 U/L (ref 9–39)
BILIRUB SERPL-MCNC: 0.6 MG/DL (ref 0–1.2)
BUN SERPL-MCNC: 13 MG/DL (ref 6–23)
CALCIUM SERPL-MCNC: 9.1 MG/DL (ref 8.6–10.3)
CHLORIDE SERPL-SCNC: 104 MMOL/L (ref 98–107)
CHOLEST SERPL-MCNC: 180 MG/DL (ref 0–199)
CHOLESTEROL/HDL RATIO: 4.9
CO2 SERPL-SCNC: 31 MMOL/L (ref 21–32)
CREAT SERPL-MCNC: 0.94 MG/DL (ref 0.5–1.3)
EGFRCR SERPLBLD CKD-EPI 2021: 88 ML/MIN/1.73M*2
ERYTHROCYTE [DISTWIDTH] IN BLOOD BY AUTOMATED COUNT: 13.5 % (ref 11.5–14.5)
GLUCOSE SERPL-MCNC: 90 MG/DL (ref 74–99)
HCT VFR BLD AUTO: 44.8 % (ref 41–52)
HDLC SERPL-MCNC: 37 MG/DL
HGB BLD-MCNC: 15.5 G/DL (ref 13.5–17.5)
LDLC SERPL CALC-MCNC: 71 MG/DL
MCH RBC QN AUTO: 30.7 PG (ref 26–34)
MCHC RBC AUTO-ENTMCNC: 34.6 G/DL (ref 32–36)
MCV RBC AUTO: 89 FL (ref 80–100)
NON HDL CHOLESTEROL: 143 MG/DL (ref 0–149)
NRBC BLD-RTO: 0 /100 WBCS (ref 0–0)
PLATELET # BLD AUTO: 195 X10*3/UL (ref 150–450)
POTASSIUM SERPL-SCNC: 4.6 MMOL/L (ref 3.5–5.3)
PROT SERPL-MCNC: 6.7 G/DL (ref 6.4–8.2)
RBC # BLD AUTO: 5.05 X10*6/UL (ref 4.5–5.9)
SODIUM SERPL-SCNC: 141 MMOL/L (ref 136–145)
TRIGL SERPL-MCNC: 358 MG/DL (ref 0–149)
VLDL: 72 MG/DL (ref 0–40)
WBC # BLD AUTO: 4.9 X10*3/UL (ref 4.4–11.3)

## 2024-04-15 PROCEDURE — 36415 COLL VENOUS BLD VENIPUNCTURE: CPT

## 2024-04-15 PROCEDURE — 80053 COMPREHEN METABOLIC PANEL: CPT

## 2024-04-15 PROCEDURE — 80061 LIPID PANEL: CPT

## 2024-04-15 PROCEDURE — 85027 COMPLETE CBC AUTOMATED: CPT

## 2024-06-03 ENCOUNTER — OFFICE VISIT (OUTPATIENT)
Dept: GASTROENTEROLOGY | Facility: CLINIC | Age: 69
End: 2024-06-03
Payer: MEDICARE

## 2024-06-03 VITALS
HEART RATE: 87 BPM | SYSTOLIC BLOOD PRESSURE: 134 MMHG | OXYGEN SATURATION: 95 % | HEIGHT: 78 IN | BODY MASS INDEX: 34.59 KG/M2 | WEIGHT: 299 LBS | DIASTOLIC BLOOD PRESSURE: 90 MMHG

## 2024-06-03 DIAGNOSIS — R13.19 ESOPHAGEAL DYSPHAGIA: ICD-10-CM

## 2024-06-03 DIAGNOSIS — K21.9 GASTROESOPHAGEAL REFLUX DISEASE WITHOUT ESOPHAGITIS: ICD-10-CM

## 2024-06-03 PROCEDURE — 1157F ADVNC CARE PLAN IN RCRD: CPT | Performed by: NURSE PRACTITIONER

## 2024-06-03 PROCEDURE — 3075F SYST BP GE 130 - 139MM HG: CPT | Performed by: NURSE PRACTITIONER

## 2024-06-03 PROCEDURE — 3080F DIAST BP >= 90 MM HG: CPT | Performed by: NURSE PRACTITIONER

## 2024-06-03 PROCEDURE — 1159F MED LIST DOCD IN RCRD: CPT | Performed by: NURSE PRACTITIONER

## 2024-06-03 PROCEDURE — 1036F TOBACCO NON-USER: CPT | Performed by: NURSE PRACTITIONER

## 2024-06-03 PROCEDURE — 99214 OFFICE O/P EST MOD 30 MIN: CPT | Performed by: NURSE PRACTITIONER

## 2024-06-03 RX ORDER — OMEPRAZOLE 40 MG/1
40 CAPSULE, DELAYED RELEASE ORAL
Qty: 30 CAPSULE | Refills: 2 | Status: SHIPPED | OUTPATIENT
Start: 2024-06-03 | End: 2025-06-03

## 2024-06-03 NOTE — PROGRESS NOTES
Assessment/Plan   Bandar Castellanos is a 68 y.o. male with PMH significant for HTN, HLD, obesity, psoriasis who presents to GI clinic for long history of reflux for which he has been taking over-the-counter Nexium 20 mg p.o.3 times a week x 5 years.  Now reporting episodes of food sticking in his esophagus and a few episodes of food (rice, noodles) getting lodged in his esophagus.  Denies routine NSAID use.    GERD  Esophageal dysphagia     Recommend omeprazole 40 mg po once daily, take 30 minutes before 1st meal  Antireflux lifestyle  Avoid NSAIDs aspirin, Excedrin, Advil, Aleve, ibuprofen, Motrin, naproxen, Naprosyn  Recommend EGD +/- biopsies +/- dilation to be done at endoscopy center in [place]   Patient aware he will need a  the day of the procedure   Follow up with GI 2 weeks after EGD to review results     Antireflux lifestyle modifications   Avoid alcoholic beverages, Caffeine, carbonated beverages, smoking, trigger foods. Trigger foods include citrus fruits, chocolate, fatty and fried foods, garlic and onions, mint flavorings, spicy foods and tomato-based foods like spaghetti sauce, salsa, chili or pizza.  Avoid aspirin and NSAIDs if possible  Eat small frequent meals. Eat at least 2 hours prior to going to bed.   Try raising the head of the bed to prevent stomach acid from coming back up.   Recommend stress reduction, weight reduction, and physical exercise can help reduce symptoms of GERD.   Call or return to office if GERD symptoms become worse or if you have difficulty swallowing, pain with swallowing, are vomiting blood, notice black or bloody stools, or have unexplained weight loss.      Lidia Cook, APRN-CNP      Subjective     History of Present Illness:   Bandar Castellanos is a 68 y.o. male with PMH significant for HTN, HLD, obesity, psoriasis who presents to GI clinic for long history of reflux for which he has been taking over-the-counter Nexium >3 times a week.  Now  reporting episodes of food sticking in his esophagus and a few episodes of food getting lodged in his esophagus.    He takes Nexium 20 mg every 2-3 days.  Denies symptoms if he is taking medication, if he stops taking medication he will develop symptoms within 3 to 5 days.  Patient has been taking Nexium 3 times a week for 5 years.  He has had a few episodes of food sticking.  He says there has been many times when food has gotten stuck and he nearly had to go to the emergency room.  He denies any severe epigastric pain or chest pain.  He has noticed that he needs to eat smaller bites than he used to.     NSAIDs - Ibuprofen as needed 1x week.     Cologuard negative 2022.  Colonoscopy x2 negative/normal per patient, no h/o colon polyps.    Review of Systems  ROS Negative unless otherwise stated above.    Past Medical History   has a past medical history of Foot pain (2023), Heartburn (2023), Hordeolum external (2023), Lumbar back pain (2023), Other specified health status, Plantar fasciitis, left (2023), Sprain of ankle, left (2023), and Umbilical hernia (2023).     Social History   reports that he has quit smoking. His smoking use included cigarettes. He has never used smokeless tobacco. He reports current alcohol use. He reports that he does not use drugs.     Family History  family history includes Cancer in his father. Father lung cancer. Mother anemia, rectal bleeding ... Unknown colonoscopy result --  92. No family history of GI malignancies.     Allergies  Allergies   Allergen Reactions    Fenofibrate Unknown       Medications  Current Outpatient Medications   Medication Instructions    atorvastatin (LIPITOR) 20 mg, oral, Daily    esomeprazole (NexIUM) 20 mg DR capsule oral    magnesium 250 mg tablet oral, 2 times daily    multivitamin capsule oral    omega 3-dha-epa-fish oil (Fish OiL) 1,000 mg (120 mg-180 mg) capsule oral    primidone (MYSOLINE) 25 mg, oral,  Nightly        Objective   General: A&Ox3, NAD.  HEENT: AT/NC.   CV: RRR. No murmur.  Resp: CTA bilaterally. No wheezing, rhonchi or rales.   GI: Soft, NT/ND. BSx4.  Extrem: No edema. Pulses intact.  Skin: No Jaundice.   Neuro: No focal deficits.   Psych: Normal mood and affect.     Lab Results   Component Value Date    WBC 4.9 04/15/2024    WBC CANCELED 07/05/2023    WBC 5.1 04/14/2023    HGB 15.5 04/15/2024    HGB CANCELED 07/05/2023    HGB 15.5 04/14/2023    HCT 44.8 04/15/2024    HCT CANCELED 07/05/2023    HCT 46.5 04/14/2023     04/15/2024    PLT CANCELED 07/05/2023     04/14/2023     Lab Results   Component Value Date     04/15/2024    K 4.6 04/15/2024     04/15/2024    CO2 31 04/15/2024    BUN 13 04/15/2024    CREATININE 0.94 04/15/2024    GLUCOSE 90 04/15/2024    CALCIUM 9.1 04/15/2024       Lab Results   Component Value Date    ALKPHOS 53 04/15/2024    ALKPHOS 59 04/14/2023    ALKPHOS 56 08/23/2022    BILITOT 0.6 04/15/2024    BILITOT 1.0 04/14/2023    BILITOT 0.7 08/23/2022    PROT 6.7 04/15/2024    PROT 6.8 04/14/2023    PROT 6.8 08/23/2022    ALT 27 04/15/2024    ALT 34 04/14/2023    ALT 26 08/23/2022    AST 18 04/15/2024    AST 24 04/14/2023    AST 20 08/23/2022      Lab Results   Component Value Date    INR CANCELED 07/05/2023

## 2024-06-03 NOTE — H&P (VIEW-ONLY)
Assessment/Plan   Bandar Castellanos is a 68 y.o. male with PMH significant for HTN, HLD, obesity, psoriasis who presents to GI clinic for long history of reflux for which he has been taking over-the-counter Nexium 20 mg p.o.3 times a week x 5 years.  Now reporting episodes of food sticking in his esophagus and a few episodes of food (rice, noodles) getting lodged in his esophagus.  Denies routine NSAID use.    GERD  Esophageal dysphagia     Recommend omeprazole 40 mg po once daily, take 30 minutes before 1st meal  Antireflux lifestyle  Avoid NSAIDs aspirin, Excedrin, Advil, Aleve, ibuprofen, Motrin, naproxen, Naprosyn  Recommend EGD +/- biopsies +/- dilation to be done at endoscopy center in [place]   Patient aware he will need a  the day of the procedure   Follow up with GI 2 weeks after EGD to review results     Antireflux lifestyle modifications   Avoid alcoholic beverages, Caffeine, carbonated beverages, smoking, trigger foods. Trigger foods include citrus fruits, chocolate, fatty and fried foods, garlic and onions, mint flavorings, spicy foods and tomato-based foods like spaghetti sauce, salsa, chili or pizza.  Avoid aspirin and NSAIDs if possible  Eat small frequent meals. Eat at least 2 hours prior to going to bed.   Try raising the head of the bed to prevent stomach acid from coming back up.   Recommend stress reduction, weight reduction, and physical exercise can help reduce symptoms of GERD.   Call or return to office if GERD symptoms become worse or if you have difficulty swallowing, pain with swallowing, are vomiting blood, notice black or bloody stools, or have unexplained weight loss.      Lidia Cook, APRN-CNP      Subjective     History of Present Illness:   Bandar Castellanos is a 68 y.o. male with PMH significant for HTN, HLD, obesity, psoriasis who presents to GI clinic for long history of reflux for which he has been taking over-the-counter Nexium >3 times a week.  Now  reporting episodes of food sticking in his esophagus and a few episodes of food getting lodged in his esophagus.    He takes Nexium 20 mg every 2-3 days.  Denies symptoms if he is taking medication, if he stops taking medication he will develop symptoms within 3 to 5 days.  Patient has been taking Nexium 3 times a week for 5 years.  He has had a few episodes of food sticking.  He says there has been many times when food has gotten stuck and he nearly had to go to the emergency room.  He denies any severe epigastric pain or chest pain.  He has noticed that he needs to eat smaller bites than he used to.     NSAIDs - Ibuprofen as needed 1x week.     Cologuard negative 2022.  Colonoscopy x2 negative/normal per patient, no h/o colon polyps.    Review of Systems  ROS Negative unless otherwise stated above.    Past Medical History   has a past medical history of Foot pain (2023), Heartburn (2023), Hordeolum external (2023), Lumbar back pain (2023), Other specified health status, Plantar fasciitis, left (2023), Sprain of ankle, left (2023), and Umbilical hernia (2023).     Social History   reports that he has quit smoking. His smoking use included cigarettes. He has never used smokeless tobacco. He reports current alcohol use. He reports that he does not use drugs.     Family History  family history includes Cancer in his father. Father lung cancer. Mother anemia, rectal bleeding ... Unknown colonoscopy result --  92. No family history of GI malignancies.     Allergies  Allergies   Allergen Reactions    Fenofibrate Unknown       Medications  Current Outpatient Medications   Medication Instructions    atorvastatin (LIPITOR) 20 mg, oral, Daily    esomeprazole (NexIUM) 20 mg DR capsule oral    magnesium 250 mg tablet oral, 2 times daily    multivitamin capsule oral    omega 3-dha-epa-fish oil (Fish OiL) 1,000 mg (120 mg-180 mg) capsule oral    primidone (MYSOLINE) 25 mg, oral,  Nightly        Objective   General: A&Ox3, NAD.  HEENT: AT/NC.   CV: RRR. No murmur.  Resp: CTA bilaterally. No wheezing, rhonchi or rales.   GI: Soft, NT/ND. BSx4.  Extrem: No edema. Pulses intact.  Skin: No Jaundice.   Neuro: No focal deficits.   Psych: Normal mood and affect.     Lab Results   Component Value Date    WBC 4.9 04/15/2024    WBC CANCELED 07/05/2023    WBC 5.1 04/14/2023    HGB 15.5 04/15/2024    HGB CANCELED 07/05/2023    HGB 15.5 04/14/2023    HCT 44.8 04/15/2024    HCT CANCELED 07/05/2023    HCT 46.5 04/14/2023     04/15/2024    PLT CANCELED 07/05/2023     04/14/2023     Lab Results   Component Value Date     04/15/2024    K 4.6 04/15/2024     04/15/2024    CO2 31 04/15/2024    BUN 13 04/15/2024    CREATININE 0.94 04/15/2024    GLUCOSE 90 04/15/2024    CALCIUM 9.1 04/15/2024       Lab Results   Component Value Date    ALKPHOS 53 04/15/2024    ALKPHOS 59 04/14/2023    ALKPHOS 56 08/23/2022    BILITOT 0.6 04/15/2024    BILITOT 1.0 04/14/2023    BILITOT 0.7 08/23/2022    PROT 6.7 04/15/2024    PROT 6.8 04/14/2023    PROT 6.8 08/23/2022    ALT 27 04/15/2024    ALT 34 04/14/2023    ALT 26 08/23/2022    AST 18 04/15/2024    AST 24 04/14/2023    AST 20 08/23/2022      Lab Results   Component Value Date    INR CANCELED 07/05/2023

## 2024-06-10 ENCOUNTER — ANESTHESIA EVENT (OUTPATIENT)
Dept: GASTROENTEROLOGY | Facility: EXTERNAL LOCATION | Age: 69
End: 2024-06-10

## 2024-06-14 ENCOUNTER — APPOINTMENT (OUTPATIENT)
Dept: GASTROENTEROLOGY | Facility: EXTERNAL LOCATION | Age: 69
End: 2024-06-14
Payer: MEDICARE

## 2024-06-14 ENCOUNTER — ANESTHESIA (OUTPATIENT)
Dept: GASTROENTEROLOGY | Facility: EXTERNAL LOCATION | Age: 69
End: 2024-06-14

## 2024-06-14 VITALS
HEIGHT: 78 IN | WEIGHT: 299 LBS | RESPIRATION RATE: 18 BRPM | TEMPERATURE: 97.3 F | HEART RATE: 78 BPM | OXYGEN SATURATION: 96 % | SYSTOLIC BLOOD PRESSURE: 127 MMHG | BODY MASS INDEX: 34.59 KG/M2 | DIASTOLIC BLOOD PRESSURE: 78 MMHG

## 2024-06-14 DIAGNOSIS — K21.9 GASTROESOPHAGEAL REFLUX DISEASE WITHOUT ESOPHAGITIS: Primary | ICD-10-CM

## 2024-06-14 DIAGNOSIS — R13.19 ESOPHAGEAL DYSPHAGIA: ICD-10-CM

## 2024-06-14 PROCEDURE — 43239 EGD BIOPSY SINGLE/MULTIPLE: CPT | Performed by: INTERNAL MEDICINE

## 2024-06-14 RX ORDER — LIDOCAINE HYDROCHLORIDE 20 MG/ML
INJECTION, SOLUTION EPIDURAL; INFILTRATION; INTRACAUDAL; PERINEURAL AS NEEDED
Status: DISCONTINUED | OUTPATIENT
Start: 2024-06-14 | End: 2024-06-14

## 2024-06-14 RX ORDER — PROPOFOL 10 MG/ML
INJECTION, EMULSION INTRAVENOUS AS NEEDED
Status: DISCONTINUED | OUTPATIENT
Start: 2024-06-14 | End: 2024-06-14

## 2024-06-14 RX ORDER — SODIUM CHLORIDE, SODIUM LACTATE, POTASSIUM CHLORIDE, CALCIUM CHLORIDE 600; 310; 30; 20 MG/100ML; MG/100ML; MG/100ML; MG/100ML
20 INJECTION, SOLUTION INTRAVENOUS CONTINUOUS
Status: CANCELLED | OUTPATIENT
Start: 2024-06-14

## 2024-06-14 RX ORDER — SODIUM CHLORIDE, SODIUM LACTATE, POTASSIUM CHLORIDE, CALCIUM CHLORIDE 600; 310; 30; 20 MG/100ML; MG/100ML; MG/100ML; MG/100ML
INJECTION, SOLUTION INTRAVENOUS CONTINUOUS PRN
Status: DISCONTINUED | OUTPATIENT
Start: 2024-06-14 | End: 2024-06-14

## 2024-06-14 SDOH — HEALTH STABILITY: MENTAL HEALTH: CURRENT SMOKER: 0

## 2024-06-14 ASSESSMENT — PAIN SCALES - GENERAL
PAIN_LEVEL: 0
PAINLEVEL_OUTOF10: 0 - NO PAIN

## 2024-06-14 ASSESSMENT — PAIN - FUNCTIONAL ASSESSMENT
PAIN_FUNCTIONAL_ASSESSMENT: 0-10

## 2024-06-14 ASSESSMENT — COLUMBIA-SUICIDE SEVERITY RATING SCALE - C-SSRS

## 2024-06-14 NOTE — ANESTHESIA PREPROCEDURE EVALUATION
Patient: Bandar Castellanos    Procedure Information       Date/Time: 06/14/24 0900    Scheduled providers: Dominic Reynaga MD    Procedure: EGD    Location: [facility]            Relevant Problems   Cardiac   (+) Hypertriglyceridemia   (+) Mixed hyperlipidemia   (+) Primary hypertension      Neuro   (+) Piriformis syndrome, left      GI   (+) Esophageal dysphagia   (+) Gastroesophageal reflux disease without esophagitis      Endocrine   (+) Obesity, unspecified      Musculoskeletal   (+) Lumbosacral spondylosis without myelopathy      Skin   (+) Psoriasis, guttate       Clinical information reviewed:   Tobacco  Allergies  Meds   Med Hx  Surg Hx   Fam Hx  Soc Hx      Vitals:    06/14/24 0906   BP: 114/73   Pulse: 79   Resp: 20   SpO2: 95%       Past Surgical History:   Procedure Laterality Date    OTHER SURGICAL HISTORY  11/30/2018    Colonoscopy     Past Medical History:   Diagnosis Date    Foot pain 02/02/2023    GERD (gastroesophageal reflux disease)     Heartburn 02/02/2023    Hordeolum external 02/02/2023    Hyperlipidemia     Lumbar back pain 02/02/2023    Other specified health status     No pertinent past medical history    Plantar fasciitis, left 02/02/2023    Sprain of ankle, left 02/02/2023    Umbilical hernia 02/02/2023       Current Outpatient Medications:     atorvastatin (Lipitor) 20 mg tablet, Take 1 tablet (20 mg) by mouth once daily., Disp: 90 tablet, Rfl: 1    esomeprazole (NexIUM) 20 mg DR capsule, Take by mouth., Disp: , Rfl:     magnesium 250 mg tablet, Take by mouth 2 times a day., Disp: , Rfl:     multivitamin capsule, Take by mouth., Disp: , Rfl:     omega 3-dha-epa-fish oil (Fish OiL) 1,000 mg (120 mg-180 mg) capsule, Take by mouth., Disp: , Rfl:     omeprazole (PriLOSEC) 40 mg DR capsule, Take 1 capsule (40 mg) by mouth once daily in the morning. Take before meals. Do not crush or chew., Disp: 30 capsule, Rfl: 2    primidone (Mysoline) 50 mg tablet, Take 0.5 tablets (25  mg) by mouth once daily at bedtime., Disp: 15 tablet, Rfl: 6  Prior to Admission medications    Medication Sig Start Date End Date Taking? Authorizing Provider   atorvastatin (Lipitor) 20 mg tablet Take 1 tablet (20 mg) by mouth once daily. 4/9/24  Yes René Mckenna MD   esomeprazole (NexIUM) 20 mg DR capsule Take by mouth. 11/30/18  Yes Historical Provider, MD   magnesium 250 mg tablet Take by mouth 2 times a day.   Yes Historical Provider, MD   multivitamin capsule Take by mouth. 11/30/18  Yes Historical Provider, MD   omega 3-dha-epa-fish oil (Fish OiL) 1,000 mg (120 mg-180 mg) capsule Take by mouth.   Yes Historical Provider, MD   omeprazole (PriLOSEC) 40 mg DR capsule Take 1 capsule (40 mg) by mouth once daily in the morning. Take before meals. Do not crush or chew. 6/3/24 6/3/25 Yes COLIN Roque-CNP   primidone (Mysoline) 50 mg tablet Take 0.5 tablets (25 mg) by mouth once daily at bedtime. 4/9/24  Yes René Mckenna MD     Allergies   Allergen Reactions    Fenofibrate Unknown     Social History     Tobacco Use    Smoking status: Former     Types: Cigarettes    Smokeless tobacco: Never   Substance Use Topics    Alcohol use: Not Currently     Comment: rare         Chemistry    Lab Results   Component Value Date/Time     04/15/2024 1110    K 4.6 04/15/2024 1110     04/15/2024 1110    CO2 31 04/15/2024 1110    BUN 13 04/15/2024 1110    CREATININE 0.94 04/15/2024 1110    Lab Results   Component Value Date/Time    CALCIUM 9.1 04/15/2024 1110    ALKPHOS 53 04/15/2024 1110    AST 18 04/15/2024 1110    ALT 27 04/15/2024 1110    BILITOT 0.6 04/15/2024 1110          Lab Results   Component Value Date/Time    WBC 4.9 04/15/2024 1110    HGB 15.5 04/15/2024 1110    HCT 44.8 04/15/2024 1110     04/15/2024 1110     Lab Results   Component Value Date/Time    PROTIME CANCELED 07/05/2023 1237    INR CANCELED 07/05/2023 1237     No results found for this or any previous visit (from the past 4464  hour(s)).    NPO Detail:  NPO/Void Status  Date of Last Liquid: 06/13/24  Time of Last Liquid: 2100  Date of Last Solid: 06/14/24  Time of Last Solid: 2130  Last Intake Type: Clear fluids         Physical Exam    Airway  Mallampati: III  TM distance: >3 FB  Neck ROM: full     Cardiovascular - normal exam     Dental - normal exam     Pulmonary - normal exam     Abdominal - normal exam             Anesthesia Plan    History of general anesthesia?: yes  History of complications of general anesthesia?: no    ASA 3     MAC     The patient is not a current smoker.    intravenous induction   Anesthetic plan and risks discussed with patient.    Plan discussed with attending.

## 2024-06-14 NOTE — DISCHARGE INSTRUCTIONS

## 2024-06-14 NOTE — ANESTHESIA POSTPROCEDURE EVALUATION
Patient: Bandra Castellanos    Procedure Summary       Date: 06/14/24 Room / Location: [facility]    Anesthesia Start: 0922 Anesthesia Stop: 0933    Procedure: EGD Diagnosis:       Gastroesophageal reflux disease without esophagitis      Esophageal dysphagia    Scheduled Providers: Dominic Reynaga MD Responsible Provider: GAGE Sexton    Anesthesia Type: MAC ASA Status: 3            Anesthesia Type: MAC    Vitals Value Taken Time   /64 06/14/24 0933   Temp 36.3 °C (97.3 °F) 06/14/24 0933   Pulse 77 06/14/24 0933   Resp 18 06/14/24 0933   SpO2 95 % 06/14/24 0933       Anesthesia Post Evaluation    Patient location during evaluation: bedside  Patient participation: complete - patient participated  Level of consciousness: awake and alert  Pain score: 0  Pain management: adequate  Airway patency: patent  Cardiovascular status: acceptable  Respiratory status: acceptable  Hydration status: acceptable  Postoperative Nausea and Vomiting: none        No notable events documented.

## 2024-06-24 LAB
LABORATORY COMMENT REPORT: NORMAL
PATH REPORT.FINAL DX SPEC: NORMAL
PATH REPORT.GROSS SPEC: NORMAL
PATH REPORT.TOTAL CANCER: NORMAL

## 2024-09-09 ENCOUNTER — APPOINTMENT (OUTPATIENT)
Dept: PRIMARY CARE | Facility: CLINIC | Age: 69
End: 2024-09-09
Payer: MEDICARE

## 2024-09-09 VITALS
SYSTOLIC BLOOD PRESSURE: 122 MMHG | DIASTOLIC BLOOD PRESSURE: 80 MMHG | RESPIRATION RATE: 18 BRPM | WEIGHT: 299 LBS | TEMPERATURE: 98 F | HEIGHT: 78 IN | BODY MASS INDEX: 34.59 KG/M2 | HEART RATE: 67 BPM

## 2024-09-09 DIAGNOSIS — Z23 NEED FOR VACCINATION: ICD-10-CM

## 2024-09-09 DIAGNOSIS — Z12.5 SCREENING FOR PROSTATE CANCER: ICD-10-CM

## 2024-09-09 DIAGNOSIS — Z00.00 ROUTINE GENERAL MEDICAL EXAMINATION AT HEALTH CARE FACILITY: Primary | ICD-10-CM

## 2024-09-09 DIAGNOSIS — Z13.6 SCREENING FOR AAA (ABDOMINAL AORTIC ANEURYSM): ICD-10-CM

## 2024-09-09 PROBLEM — M77.30 CALCANEAL SPUR: Status: ACTIVE | Noted: 2024-09-09

## 2024-09-09 PROCEDURE — 3079F DIAST BP 80-89 MM HG: CPT | Performed by: FAMILY MEDICINE

## 2024-09-09 PROCEDURE — 90662 IIV NO PRSV INCREASED AG IM: CPT | Performed by: FAMILY MEDICINE

## 2024-09-09 PROCEDURE — G0008 ADMIN INFLUENZA VIRUS VAC: HCPCS | Performed by: FAMILY MEDICINE

## 2024-09-09 PROCEDURE — 3008F BODY MASS INDEX DOCD: CPT | Performed by: FAMILY MEDICINE

## 2024-09-09 PROCEDURE — 1157F ADVNC CARE PLAN IN RCRD: CPT | Performed by: FAMILY MEDICINE

## 2024-09-09 PROCEDURE — G0439 PPPS, SUBSEQ VISIT: HCPCS | Performed by: FAMILY MEDICINE

## 2024-09-09 PROCEDURE — 3074F SYST BP LT 130 MM HG: CPT | Performed by: FAMILY MEDICINE

## 2024-09-09 PROCEDURE — 1159F MED LIST DOCD IN RCRD: CPT | Performed by: FAMILY MEDICINE

## 2024-09-09 PROCEDURE — 1160F RVW MEDS BY RX/DR IN RCRD: CPT | Performed by: FAMILY MEDICINE

## 2024-09-09 PROCEDURE — 1158F ADVNC CARE PLAN TLK DOCD: CPT | Performed by: FAMILY MEDICINE

## 2024-09-09 PROCEDURE — 1170F FXNL STATUS ASSESSED: CPT | Performed by: FAMILY MEDICINE

## 2024-09-09 PROCEDURE — 1123F ACP DISCUSS/DSCN MKR DOCD: CPT | Performed by: FAMILY MEDICINE

## 2024-09-09 ASSESSMENT — ACTIVITIES OF DAILY LIVING (ADL)
TAKING_MEDICATION: INDEPENDENT
MANAGING_FINANCES: INDEPENDENT
DOING_HOUSEWORK: INDEPENDENT
DRESSING: INDEPENDENT
BATHING: INDEPENDENT
GROCERY_SHOPPING: INDEPENDENT

## 2024-09-09 NOTE — PROGRESS NOTES
History Of Present Illness  Bandar Castellanos is a 68 y.o. male presenting for a Medicare Annual Wellness Exam.  Patient is here for a periodic health exam.  I reviewed previous preventative health measures including screening tests, immunizations and labs.  I reviewed screenings administered by my staff today.       PREVIOUS PREVENTATIVE HEALTH    Colonoscopy : No  Cologuard : Yes    Date: 8/23/2022  Prostate cancer screening with PSA : Yes    Date: 9/12/2023  Hepatitis C Antibody : Yes    Date: 9/12/2023  Prevnar : Yes    Date: 6/22/2020  Shingrix : Yes    Date: 2020 and 2022  Tdap within 10 years : Yes  Yearly Flu Shot : Yes      CURRENT FINDINGS    Falls : No  Problems with ADL's :  No  Healthy Diet : Yes  Exercise :  Yes  Vision or Hearing Problems : No  Depression Issues : No  Alcohol Use : No  Tobacco Use : No.  Previous smoker.           Past Medical History  Patient Active Problem List   Diagnosis    Calcaneal spur of left foot    ED (erectile dysfunction)    Mixed hyperlipidemia    Hypertriglyceridemia    Incisional hernia, without obstruction or gangrene    Piriformis syndrome, left    Psoriasis, guttate    Tremor, essential    Rectus diastasis    Lumbosacral spondylosis without myelopathy    Sebaceous cyst    Obesity, unspecified    Primary hypertension    Esophageal dysphagia    Gastroesophageal reflux disease without esophagitis    Multiple nevi    Calcaneal spur       Past Surgical History:   Procedure Laterality Date    OTHER SURGICAL HISTORY  11/30/2018    Colonoscopy        Current Outpatient Medications:     atorvastatin (Lipitor) 20 mg tablet, Take 1 tablet (20 mg) by mouth once daily., Disp: 90 tablet, Rfl: 1    magnesium 250 mg tablet, Take by mouth 2 times a day., Disp: , Rfl:     multivitamin capsule, Take by mouth., Disp: , Rfl:     omega 3-dha-epa-fish oil (Fish OiL) 1,000 mg (120 mg-180 mg) capsule, Take by mouth., Disp: , Rfl:     primidone (Mysoline) 50 mg tablet, Take 0.5 tablets (25  mg) by mouth once daily at bedtime., Disp: 15 tablet, Rfl: 6    esomeprazole (NexIUM) 20 mg DR capsule, Take by mouth., Disp: , Rfl:     omeprazole (PriLOSEC) 40 mg DR capsule, Take 1 capsule (40 mg) by mouth once daily in the morning. Take before meals. Do not crush or chew., Disp: 30 capsule, Rfl: 0   Immunization History   Administered Date(s) Administered    Flu vaccine, quadrivalent, high-dose, preservative free, age 65y+ (FLUZONE) 09/12/2023    Flu vaccine, trivalent, preservative free, HIGH-DOSE, age 65y+ (Fluzone) 09/09/2024    Influenza, seasonal, injectable 12/21/2020    Pfizer COVID-19 vaccine, Fall 2023, 12 years and older, (30mcg/0.3mL) 11/11/2023    Pfizer COVID-19 vaccine, bivalent, age 12 years and older (30 mcg/0.3 mL) 11/25/2022    Pfizer Purple Cap SARS-CoV-2 03/05/2021, 03/26/2021, 10/07/2021    Pneumococcal conjugate vaccine, 13-valent (PREVNAR 13) 06/22/2020    Pneumococcal polysaccharide vaccine, 23-valent, age 2 years and older (PNEUMOVAX 23) 11/02/2017    SARS-CoV-2, Unspecified 05/12/2022    Td vaccine, age 7 years and older (TDVAX) 11/02/2017    Zoster vaccine, recombinant, adult (SHINGRIX) 12/21/2020, 03/14/2022        Social History  Social History     Socioeconomic History    Marital status:      Spouse name: Not on file    Number of children: Not on file    Years of education: Not on file    Highest education level: Not on file   Occupational History    Not on file   Tobacco Use    Smoking status: Former     Types: Cigarettes    Smokeless tobacco: Never   Vaping Use    Vaping status: Never Used   Substance and Sexual Activity    Alcohol use: Not Currently     Comment: rare    Drug use: Never    Sexual activity: Not on file   Other Topics Concern    Not on file   Social History Narrative    Not on file     Social Determinants of Health     Financial Resource Strain: Not on file   Food Insecurity: Not on file   Transportation Needs: Not on file   Physical Activity: Not on file  "  Stress: Not on file   Social Connections: Not on file   Intimate Partner Violence: Not on file   Housing Stability: Not on file        reports that he does not currently use alcohol.    reports that he has quit smoking. His smoking use included cigarettes. He has never used smokeless tobacco.    reports no history of drug use.           Allergies  Fenofibrate      Physical Exam   height is 2.007 m (6' 7\") and weight is 136 kg (299 lb). His temperature is 36.7 °C (98 °F). His blood pressure is 122/80 and his pulse is 67. His respiration is 18.    Physical Exam  Vitals and nursing note reviewed.   Constitutional:       General: He is not in acute distress.     Appearance: Normal appearance.   HENT:      Head: Normocephalic and atraumatic.      Right Ear: Tympanic membrane, ear canal and external ear normal.      Left Ear: Tympanic membrane, ear canal and external ear normal.      Nose: Nose normal.      Mouth/Throat:      Mouth: Mucous membranes are moist.      Pharynx: Oropharynx is clear.   Eyes:      Extraocular Movements: Extraocular movements intact.      Conjunctiva/sclera: Conjunctivae normal.      Pupils: Pupils are equal, round, and reactive to light.   Cardiovascular:      Rate and Rhythm: Normal rate and regular rhythm.      Pulses: Normal pulses.      Heart sounds: Normal heart sounds. No murmur heard.     No friction rub. No gallop.   Pulmonary:      Effort: Pulmonary effort is normal. No respiratory distress.      Breath sounds: Normal breath sounds.   Abdominal:      General: Abdomen is flat. Bowel sounds are normal. There is no distension.      Palpations: Abdomen is soft.      Tenderness: There is no abdominal tenderness.   Musculoskeletal:         General: Normal range of motion.      Cervical back: Normal range of motion and neck supple.   Lymphadenopathy:      Cervical: No cervical adenopathy.   Skin:     General: Skin is warm and dry.      Findings: No lesion or rash.   Neurological:      " General: No focal deficit present.      Mental Status: He is alert. Mental status is at baseline.   Psychiatric:         Mood and Affect: Mood normal.         Behavior: Behavior normal.         Thought Content: Thought content normal.         Judgment: Judgment normal.                 Assessment      1. Routine general medical examination at health care facility  1 Year [facility] Up [facility] - PCP - Wellness Exam, 1 Year Follow Up In [facility] - PCP - Wellness Exam      2. Need for vaccination  Flu vaccine, high dose seasonal, preservative free      3. Screening for AAA (abdominal aortic aneurysm)  Vascular US abdominal aorta anuerysm AAA screening   The patient is a previous smoker and has never been screened for AAA.  I have ordered an ultrasound and explained the importance of this test.     4. Screening for prostate cancer  Prostate Specific Antigen, Screen           I recommend regular exercise, balanced diet, regular dental exams, and healthy habits.  Patient denies depression sxs.  Patient is able to perform all ADL's without assistance.  I reminded patient to get yearly eye exams with glaucoma screening.  I recommend to eat plenty of plant foods (such as whole-grain products, fruits, and vegetables) and a moderate amount of lean and low-fat, animal-based food (meat and dairy products).  When shopping, choose lean meats, fish, and poultry. I recommend to try to get regular aerobic exercise.  I recommend a yearly flu shot in the fall and I recommend a yearly wellness exam.            Orders Placed This Encounter   Procedures    Flu vaccine, high dose seasonal, preservative free    Prostate Specific Antigen, Screen        No orders of the defined types were placed in this encounter.                  René Mckenna MD

## 2024-10-03 ENCOUNTER — HOSPITAL ENCOUNTER (OUTPATIENT)
Dept: CARDIOLOGY | Facility: CLINIC | Age: 69
Discharge: HOME | End: 2024-10-03
Payer: MEDICARE

## 2024-10-03 DIAGNOSIS — Z13.6 SCREENING FOR AAA (ABDOMINAL AORTIC ANEURYSM): ICD-10-CM

## 2024-10-03 PROCEDURE — 76706 US ABDL AORTA SCREEN AAA: CPT | Performed by: INTERNAL MEDICINE

## 2024-10-03 PROCEDURE — 76706 US ABDL AORTA SCREEN AAA: CPT

## 2024-10-03 NOTE — RESULT ENCOUNTER NOTE
Please inform the patient that his recent ultrasound shows no evidence of a abdominal aortic aneurysm.  This does not need to be repeated for screening.

## 2024-10-11 ENCOUNTER — APPOINTMENT (OUTPATIENT)
Dept: PRIMARY CARE | Facility: CLINIC | Age: 69
End: 2024-10-11
Payer: MEDICARE

## 2024-10-11 VITALS
BODY MASS INDEX: 34.13 KG/M2 | SYSTOLIC BLOOD PRESSURE: 116 MMHG | DIASTOLIC BLOOD PRESSURE: 74 MMHG | RESPIRATION RATE: 18 BRPM | HEIGHT: 78 IN | TEMPERATURE: 98 F | WEIGHT: 295 LBS | HEART RATE: 72 BPM

## 2024-10-11 DIAGNOSIS — E78.1 HYPERTRIGLYCERIDEMIA: ICD-10-CM

## 2024-10-11 DIAGNOSIS — G25.0 TREMOR, ESSENTIAL: ICD-10-CM

## 2024-10-11 DIAGNOSIS — E78.2 MIXED HYPERLIPIDEMIA: ICD-10-CM

## 2024-10-11 DIAGNOSIS — K21.9 GASTROESOPHAGEAL REFLUX DISEASE WITHOUT ESOPHAGITIS: Primary | ICD-10-CM

## 2024-10-11 PROCEDURE — 3078F DIAST BP <80 MM HG: CPT | Performed by: FAMILY MEDICINE

## 2024-10-11 PROCEDURE — G2211 COMPLEX E/M VISIT ADD ON: HCPCS | Performed by: FAMILY MEDICINE

## 2024-10-11 PROCEDURE — 1158F ADVNC CARE PLAN TLK DOCD: CPT | Performed by: FAMILY MEDICINE

## 2024-10-11 PROCEDURE — 1160F RVW MEDS BY RX/DR IN RCRD: CPT | Performed by: FAMILY MEDICINE

## 2024-10-11 PROCEDURE — 1123F ACP DISCUSS/DSCN MKR DOCD: CPT | Performed by: FAMILY MEDICINE

## 2024-10-11 PROCEDURE — 1157F ADVNC CARE PLAN IN RCRD: CPT | Performed by: FAMILY MEDICINE

## 2024-10-11 PROCEDURE — 3074F SYST BP LT 130 MM HG: CPT | Performed by: FAMILY MEDICINE

## 2024-10-11 PROCEDURE — 99214 OFFICE O/P EST MOD 30 MIN: CPT | Performed by: FAMILY MEDICINE

## 2024-10-11 PROCEDURE — 1036F TOBACCO NON-USER: CPT | Performed by: FAMILY MEDICINE

## 2024-10-11 PROCEDURE — 3008F BODY MASS INDEX DOCD: CPT | Performed by: FAMILY MEDICINE

## 2024-10-11 PROCEDURE — 1159F MED LIST DOCD IN RCRD: CPT | Performed by: FAMILY MEDICINE

## 2024-10-11 RX ORDER — PRIMIDONE 50 MG/1
50 TABLET ORAL NIGHTLY
Qty: 90 TABLET | Refills: 1 | Status: SHIPPED | OUTPATIENT
Start: 2024-10-11

## 2024-10-11 RX ORDER — ATORVASTATIN CALCIUM 20 MG/1
20 TABLET, FILM COATED ORAL DAILY
Qty: 90 TABLET | Refills: 1 | Status: SHIPPED | OUTPATIENT
Start: 2024-10-11

## 2024-10-11 RX ORDER — OMEPRAZOLE 40 MG/1
40 CAPSULE, DELAYED RELEASE ORAL
Qty: 30 CAPSULE | Refills: 1 | Status: SHIPPED | OUTPATIENT
Start: 2024-10-11

## 2024-10-11 NOTE — ASSESSMENT & PLAN NOTE
I will refill omeprazole for as needed use.  His recent EGD was reviewed and did not show any signs of stricture or esophageal disease.  I told him if he finds that he is needing to use the omeprazole regularly he should follow-up with the gastroenterologist for further evaluation.    Orders:    omeprazole (PriLOSEC) 40 mg DR capsule; Take 1 capsule (40 mg) by mouth once daily in the morning. Take before meals. Do not crush or chew.

## 2024-10-11 NOTE — PROGRESS NOTES
Bandar Castellanos is a 68 y.o. male here today for   Chief Complaint   Patient presents with    GERD    Hyperlipidemia        GERD  This is a chronic problem. The current episode started more than 1 year ago.   Hyperlipidemia  This is a chronic problem. The current episode started more than 1 year ago.      HLD recheck -- Patient taking medications correctly.  No SE's of muscle pain or joint pain.  No CP, edema, myalgias.    GERD recheck -- Patient reports GI symptoms are well controlled with current treatment.  No heartburn, chest pain, vomiting, diarrhea.  No SE's from current treatment.  Patient wishes to continue the same treatment.  He had an EGD with no dangerous findings.  He says he then took omeprazole every day for 2 months and was told to discontinue the over-the-counter Nexium.  He has not taken the medication for a few months and has had no recurrence of reflux or feeling of food sticking when he swallows.  The EGD did not show any stricture or disease in the esophagus.  He says last week he did have a small amount of heartburn for 1 day and asked if he could have a refill of the omeprazole to take as needed.    Recheck essential tremor --  He says it helps but still not fully controlled.  Takes 1/2 tab at bedtime only.  Still hard to write or print or do fine manipulation.  He says it does not bother him a lot except when he is doing fine motor activities.      Brother had recurrence of colon cancer.  Pt with negative Cologuard 8/2022.      Current Outpatient Medications:     magnesium 250 mg tablet, Take by mouth 2 times a day., Disp: , Rfl:     multivitamin capsule, Take by mouth., Disp: , Rfl:     omega 3-dha-epa-fish oil (Fish OiL) 1,000 mg (120 mg-180 mg) capsule, Take by mouth., Disp: , Rfl:     atorvastatin (Lipitor) 20 mg tablet, Take 1 tablet (20 mg) by mouth once daily., Disp: 90 tablet, Rfl: 1    omeprazole (PriLOSEC) 40 mg DR capsule, Take 1 capsule (40 mg) by mouth once daily in the  "morning. Take before meals. Do not crush or chew., Disp: 30 capsule, Rfl: 1    primidone (Mysoline) 50 mg tablet, Take 1 tablet (50 mg) by mouth once daily at bedtime., Disp: 90 tablet, Rfl: 1    Patient Active Problem List   Diagnosis    Calcaneal spur of left foot    ED (erectile dysfunction)    Mixed hyperlipidemia    Hypertriglyceridemia    Incisional hernia, without obstruction or gangrene    Piriformis syndrome, left    Psoriasis, guttate    Tremor, essential    Rectus diastasis    Lumbosacral spondylosis without myelopathy    Sebaceous cyst    Obesity, unspecified    Primary hypertension    Esophageal dysphagia    Gastroesophageal reflux disease without esophagitis    Multiple nevi    Calcaneal spur         No results found for this or any previous visit (from the past 672 hour(s)).     Objective    Visit Vitals    Visit Vitals  /74   Pulse 72   Temp 36.7 °C (98 °F)   Resp 18   Ht 2.007 m (6' 7\")   Wt 134 kg (295 lb)   BMI 33.23 kg/m²   Smoking Status Former   BSA 2.73 m²       Body mass index is 33.23 kg/m².     Physical Exam     General - Not in acute distress and cooperative.  Build & Nutrition - Well developed  Posture - Normal  Gait - Normal  Mental Status - alert and oriented x 3    Head - Normocephalic    Neck - Thyroid normal size    Eyes - Bilateral - Sclera clear and lids pink without edema or mass.      Skin - Warm and dry with no rashes on visible skin    Lungs - Clear to auscultation and normal breathing effort    Cardiovascular - RRR and no murmurs, rubs or thrill.    Peripheral Vascular - Bilateral - no edema present    Neuropsychiatric - normal mood and affect        Assessment & Plan  Gastroesophageal reflux disease without esophagitis  I will refill omeprazole for as needed use.  His recent EGD was reviewed and did not show any signs of stricture or esophageal disease.  I told him if he finds that he is needing to use the omeprazole regularly he should follow-up with the " gastroenterologist for further evaluation.    Orders:    omeprazole (PriLOSEC) 40 mg DR capsule; Take 1 capsule (40 mg) by mouth once daily in the morning. Take before meals. Do not crush or chew.    Tremor, essential  His tremor has improved with primidone but is not fully controlled.  I recommend that he increase to a whole tablet of 50 mg once daily.  If he notices any side effects such as somnolence then he can decrease back to a half tab instead.    Orders:    primidone (Mysoline) 50 mg tablet; Take 1 tablet (50 mg) by mouth once daily at bedtime.    Mixed hyperlipidemia  Condition well controlled.  No change in current treatment regimen.  Refill given of current medication.  Appropriate labs ordered or reviewed.  Make a follow up appointment with me for recheck in 6 months.  Orders:    atorvastatin (Lipitor) 20 mg tablet; Take 1 tablet (20 mg) by mouth once daily.    Hypertriglyceridemia  Condition well controlled.  No change in current treatment regimen.  Refill given of current medication.  Appropriate labs ordered or reviewed.  Make a follow up appointment with me for recheck in 6 months.  Orders:    atorvastatin (Lipitor) 20 mg tablet; Take 1 tablet (20 mg) by mouth once daily.         Orders Placed This Encounter      atorvastatin (Lipitor) 20 mg tablet      omeprazole (PriLOSEC) 40 mg DR capsule      primidone (Mysoline) 50 mg tablet       No orders of the defined types were placed in this encounter.       New Medications Ordered This Visit   Medications    omeprazole (PriLOSEC) 40 mg DR capsule     Sig: Take 1 capsule (40 mg) by mouth once daily in the morning. Take before meals. Do not crush or chew.     Dispense:  30 capsule     Refill:  1    primidone (Mysoline) 50 mg tablet     Sig: Take 1 tablet (50 mg) by mouth once daily at bedtime.     Dispense:  90 tablet     Refill:  1    atorvastatin (Lipitor) 20 mg tablet     Sig: Take 1 tablet (20 mg) by mouth once daily.     Dispense:  90 tablet      Refill:  1

## 2024-10-11 NOTE — ASSESSMENT & PLAN NOTE
Condition well controlled.  No change in current treatment regimen.  Refill given of current medication.  Appropriate labs ordered or reviewed.  Make a follow up appointment with me for recheck in 6 months.  Orders:    atorvastatin (Lipitor) 20 mg tablet; Take 1 tablet (20 mg) by mouth once daily.

## 2024-10-11 NOTE — ASSESSMENT & PLAN NOTE
His tremor has improved with primidone but is not fully controlled.  I recommend that he increase to a whole tablet of 50 mg once daily.  If he notices any side effects such as somnolence then he can decrease back to a half tab instead.    Orders:    primidone (Mysoline) 50 mg tablet; Take 1 tablet (50 mg) by mouth once daily at bedtime.

## 2024-10-17 ENCOUNTER — HOSPITAL ENCOUNTER (EMERGENCY)
Facility: HOSPITAL | Age: 69
Discharge: HOME | End: 2024-10-17
Attending: EMERGENCY MEDICINE
Payer: MEDICARE

## 2024-10-17 ENCOUNTER — APPOINTMENT (OUTPATIENT)
Dept: RADIOLOGY | Facility: HOSPITAL | Age: 69
End: 2024-10-17
Payer: MEDICARE

## 2024-10-17 VITALS
WEIGHT: 290 LBS | RESPIRATION RATE: 18 BRPM | TEMPERATURE: 97.7 F | HEART RATE: 78 BPM | OXYGEN SATURATION: 97 % | SYSTOLIC BLOOD PRESSURE: 115 MMHG | HEIGHT: 78 IN | DIASTOLIC BLOOD PRESSURE: 73 MMHG | BODY MASS INDEX: 33.55 KG/M2

## 2024-10-17 DIAGNOSIS — M77.9 TENDONITIS: Primary | ICD-10-CM

## 2024-10-17 PROCEDURE — 99283 EMERGENCY DEPT VISIT LOW MDM: CPT

## 2024-10-17 PROCEDURE — 99284 EMERGENCY DEPT VISIT MOD MDM: CPT | Performed by: EMERGENCY MEDICINE

## 2024-10-17 PROCEDURE — 73630 X-RAY EXAM OF FOOT: CPT | Mod: LEFT SIDE | Performed by: RADIOLOGY

## 2024-10-17 PROCEDURE — 2500000004 HC RX 250 GENERAL PHARMACY W/ HCPCS (ALT 636 FOR OP/ED)

## 2024-10-17 PROCEDURE — 96372 THER/PROPH/DIAG INJ SC/IM: CPT

## 2024-10-17 PROCEDURE — 73630 X-RAY EXAM OF FOOT: CPT | Mod: LT

## 2024-10-17 RX ORDER — MELOXICAM 15 MG/1
15 TABLET ORAL DAILY
Qty: 10 TABLET | Refills: 0 | Status: SHIPPED | OUTPATIENT
Start: 2024-10-17 | End: 2024-10-27

## 2024-10-17 RX ORDER — KETOROLAC TROMETHAMINE 15 MG/ML
15 INJECTION, SOLUTION INTRAMUSCULAR; INTRAVENOUS ONCE
Status: COMPLETED | OUTPATIENT
Start: 2024-10-17 | End: 2024-10-17

## 2024-10-17 RX ADMIN — KETOROLAC TROMETHAMINE 15 MG: 15 INJECTION, SOLUTION INTRAMUSCULAR; INTRAVENOUS at 13:01

## 2024-10-17 ASSESSMENT — LIFESTYLE VARIABLES
HAVE YOU EVER FELT YOU SHOULD CUT DOWN ON YOUR DRINKING: NO
TOTAL SCORE: 0
EVER HAD A DRINK FIRST THING IN THE MORNING TO STEADY YOUR NERVES TO GET RID OF A HANGOVER: NO
HAVE PEOPLE ANNOYED YOU BY CRITICIZING YOUR DRINKING: NO
EVER FELT BAD OR GUILTY ABOUT YOUR DRINKING: NO

## 2024-10-17 ASSESSMENT — COLUMBIA-SUICIDE SEVERITY RATING SCALE - C-SSRS
6. HAVE YOU EVER DONE ANYTHING, STARTED TO DO ANYTHING, OR PREPARED TO DO ANYTHING TO END YOUR LIFE?: NO
2. HAVE YOU ACTUALLY HAD ANY THOUGHTS OF KILLING YOURSELF?: NO
1. IN THE PAST MONTH, HAVE YOU WISHED YOU WERE DEAD OR WISHED YOU COULD GO TO SLEEP AND NOT WAKE UP?: NO

## 2024-10-17 ASSESSMENT — PAIN SCALES - GENERAL
PAINLEVEL_OUTOF10: 5 - MODERATE PAIN
PAINLEVEL_OUTOF10: 5 - MODERATE PAIN

## 2024-10-17 ASSESSMENT — PAIN DESCRIPTION - LOCATION: LOCATION: LEG

## 2024-10-17 ASSESSMENT — PAIN DESCRIPTION - PAIN TYPE: TYPE: ACUTE PAIN

## 2024-10-17 ASSESSMENT — PAIN DESCRIPTION - ORIENTATION: ORIENTATION: LEFT

## 2024-10-17 ASSESSMENT — PAIN - FUNCTIONAL ASSESSMENT
PAIN_FUNCTIONAL_ASSESSMENT: 0-10
PAIN_FUNCTIONAL_ASSESSMENT: 0-10

## 2024-10-17 NOTE — ED PROVIDER NOTES
EMERGENCY DEPARTMENT ENCOUNTER      Pt Name: Bandar Castellanos  MRN: [id number]  Birthdate 1955  Date of evaluation: 10/17/2024    HISTORY OF PRESENT ILLNESS    Bandar Castellanos is an 68 y.o. male with history including hypertension, GERD, hyperlipidemia presenting to the emergency department for left ankle pain.  Patient states that he has had on and off pain to the lateral aspect of his left ankle and foot.  Today he was getting up from a sitting position and took a couple steps had some severe pain to the left lateral ankle that then progressed to where patient could not ambulate.  Pain radiates up to his left calf.  Denies any injury or trauma.  No recent travel.  No recent surgery.  No history of DVTs.  Patient denies any swelling.  He thought it was red a couple weeks ago but no recent redness.      PAST MEDICAL HISTORY     Past Medical History:   Diagnosis Date    Foot pain 02/02/2023    GERD (gastroesophageal reflux disease)     Heartburn 02/02/2023    Hordeolum external 02/02/2023    Hyperlipidemia     Lumbar back pain 02/02/2023    Other specified health status     No pertinent past medical history    Plantar fasciitis, left 02/02/2023    Sprain of ankle, left 02/02/2023    Umbilical hernia 02/02/2023       SURGICAL HISTORY       Past Surgical History:   Procedure Laterality Date    OTHER SURGICAL HISTORY  11/30/2018    Colonoscopy       CURRENT MEDICATIONS       Discharge Medication List as of 10/17/2024  1:52 PM        CONTINUE these medications which have NOT CHANGED    Details   atorvastatin (Lipitor) 20 mg tablet Take 1 tablet (20 mg) by mouth once daily., Starting Fri 10/11/2024, Normal      magnesium 250 mg tablet Take by mouth 2 times a day., Historical Med      multivitamin capsule Take by mouth., Starting Fri 11/30/2018, Historical Med      omega 3-dha-epa-fish oil (Fish OiL) 1,000 mg (120 mg-180 mg) capsule Take by mouth., Historical Med      omeprazole (PriLOSEC) 40 mg DR capsule Take 1  capsule (40 mg) by mouth once daily in the morning. Take before meals. Do not crush or chew., Starting Fri 10/11/2024, Normal      primidone (Mysoline) 50 mg tablet Take 1 tablet (50 mg) by mouth once daily at bedtime., Starting Fri 10/11/2024, Normal             ALLERGIES     Fenofibrate    FAMILY HISTORY       Family History   Problem Relation Name Age of Onset    Cancer Father          SOCIAL HISTORY       Social History     Socioeconomic History    Marital status:    Tobacco Use    Smoking status: Former     Types: Cigarettes    Smokeless tobacco: Never   Vaping Use    Vaping status: Never Used   Substance and Sexual Activity    Alcohol use: Not Currently     Comment: rare    Drug use: Never       PHYSICAL EXAM       ED Triage Vitals [10/17/24 1131]   Temperature Heart Rate Respirations BP   36.5 °C (97.7 °F) 88 18 116/72      Pulse Ox Temp Source Heart Rate Source Patient Position   97 % Temporal Monitor Sitting      BP Location FiO2 (%)     Right arm --       Physical Exam  Vitals and nursing note reviewed.   Constitutional:       General: He is not in acute distress.     Appearance: He is well-developed.   HENT:      Head: Normocephalic and atraumatic.   Cardiovascular:      Rate and Rhythm: Normal rate and regular rhythm.      Pulses:           Dorsalis pedis pulses are 2+ on the right side and 2+ on the left side.      Heart sounds: No murmur heard.  Pulmonary:      Effort: Pulmonary effort is normal. No respiratory distress.      Breath sounds: Normal breath sounds.   Musculoskeletal:      Cervical back: Neck supple.      Right lower leg: Normal.      Left lower leg: Tenderness (calf) present. No swelling or deformity. No edema.      Right ankle: Normal.      Right Achilles Tendon: Normal. No tenderness or defects. Sawyer's test negative.      Left ankle: No swelling. Tenderness present over the lateral malleolus.      Left Achilles Tendon: Tenderness present. No defects. Sawyer's test negative.       Right foot: Normal.      Comments: Tenderness to palpation of the calf, peroneal tendon   Skin:     General: Skin is warm and dry.   Neurological:      Mental Status: He is alert.          DIAGNOSTIC RESULTS     LABS:  Labs Reviewed - No data to display    All other labs were within normal range or not returned as of this dictation.    Imaging  XR foot left 3+ views   Final Result   Normal radiographs of the left foot             MACRO:   None        Signed by: Lebron Walden 10/17/2024 1:47 PM   Dictation workstation:   [id number]           Procedures  Procedures     EMERGENCY DEPARTMENT COURSE/MDM:   Medical Decision Making  Bandar Castellanos is an 68 y.o. male with history including hypertension, GERD, hyperlipidemia presenting to the emergency department for left ankle pain.  On examination patient has full range of motion strength 5/5.  No obvious signs of infection.  No evidence of cellulitis.  Patient's tenderness is reproducible with palpation of the calf muscle and peroneal tendon.  Believe the patient has evidence of tendinitis.  On bedside ultrasound shows Achilles intact.  Patient given IM Toradol for tendinitis.  X-rays ordered and reviewed showed no evidence of acute fracturing.  Orthopedic follow-up.        ED Course as of 10/17/24 1435   Thu Oct 17, 2024   1305 LLE pain along with peroneals and achilles. Sudden onset. No weakness on exam and achilles is intact with palpation and on bedside US. Plantarflexion and peroneal strength 5/5 but painful. DP pulse intact. SILT throughout. Plan for nsaids, XR, ortho follow up.  [DM]      ED Course User Index  [DM] Seng Smith MD         Diagnoses as of 10/17/24 1435   Tendonitis        External records reviewed: recent inpatient, clinic, and prior ED notes  Labs and Diagnostic imaging independently reviewed/interpreted by me.    Patient plan, care, lab results and imaging were all discussed with attending.    ED Medications administered this  visit:    Medications   ketorolac (Toradol) injection 15 mg (15 mg intramuscular Given 10/17/24 1301)     New Prescriptions from this visit:    Discharge Medication List as of 10/17/2024  1:52 PM        START taking these medications    Details   meloxicam (Mobic) 15 mg tablet Take 1 tablet (15 mg) by mouth once daily for 10 days., Starting Thu 10/17/2024, Until Sun 10/27/2024, Normal             (Please note that portions of this note were completed with a voice recognition program.  Efforts were made to edit the dictations but occasionally words are mis-transcribed.)     Pretty Bean DO  Resident  10/17/24 6997    I saw and evaluated the patient. I personally obtained the key and critical portions of the history and physical exam or was physically present for key and critical portions performed by the resident/fellow/HONG. I reviewed the resident/fellow/HONG's documentation and discussed the patient with the resident/fellow/HONG. I agree with the resident/fellow/HONG's medical decision making as documented in the note.    ** Please excuse any errors in grammar or translation related to this dictation. Voice recognition software was utilized to prepare this document. **       Seng Smith MD  [facility] Emergency Medicine          Seng Smith MD  10/18/24 9022

## 2024-10-17 NOTE — DISCHARGE INSTRUCTIONS
Please use the walking boot with a left when ambulating.  If you start to feel better you can use the lift in your normal shoe.  Recommend following up with the sports medicine physician listed above.  Continue the medication prescribed to you for the next 10 days.    You develop any severe redness to the ankle inability to bend at the ankle signs of infection including fevers please return to the ED for reevaluation.

## 2025-04-12 ENCOUNTER — APPOINTMENT (OUTPATIENT)
Dept: CARDIOLOGY | Facility: HOSPITAL | Age: 70
DRG: 152 | End: 2025-04-12
Payer: MEDICARE

## 2025-04-12 ENCOUNTER — HOSPITAL ENCOUNTER (EMERGENCY)
Facility: HOSPITAL | Age: 70
Discharge: HOME | DRG: 152 | End: 2025-04-12
Attending: EMERGENCY MEDICINE
Payer: MEDICARE

## 2025-04-12 ENCOUNTER — APPOINTMENT (OUTPATIENT)
Dept: RADIOLOGY | Facility: HOSPITAL | Age: 70
DRG: 152 | End: 2025-04-12
Payer: MEDICARE

## 2025-04-12 VITALS
HEART RATE: 94 BPM | RESPIRATION RATE: 16 BRPM | TEMPERATURE: 98.6 F | WEIGHT: 300 LBS | HEIGHT: 78 IN | DIASTOLIC BLOOD PRESSURE: 74 MMHG | OXYGEN SATURATION: 96 % | SYSTOLIC BLOOD PRESSURE: 140 MMHG | BODY MASS INDEX: 34.71 KG/M2

## 2025-04-12 DIAGNOSIS — R91.1 LUNG NODULE: Primary | ICD-10-CM

## 2025-04-12 DIAGNOSIS — J21.9 BRONCHIOLITIS: ICD-10-CM

## 2025-04-12 DIAGNOSIS — R50.9 FEVER, UNSPECIFIED FEVER CAUSE: ICD-10-CM

## 2025-04-12 DIAGNOSIS — I27.20 PULMONARY HYPERTENSION (MULTI): ICD-10-CM

## 2025-04-12 LAB
ALBUMIN SERPL BCP-MCNC: 4.1 G/DL (ref 3.4–5)
ALP SERPL-CCNC: 58 U/L (ref 33–136)
ALT SERPL W P-5'-P-CCNC: 24 U/L (ref 10–52)
ANION GAP SERPL CALC-SCNC: 14 MMOL/L (ref 10–20)
APPEARANCE UR: CLEAR
AST SERPL W P-5'-P-CCNC: 19 U/L (ref 9–39)
BASOPHILS # BLD AUTO: 0.03 X10*3/UL (ref 0–0.1)
BASOPHILS NFR BLD AUTO: 0.2 %
BILIRUB SERPL-MCNC: 0.9 MG/DL (ref 0–1.2)
BILIRUB UR STRIP.AUTO-MCNC: NEGATIVE MG/DL
BUN SERPL-MCNC: 14 MG/DL (ref 6–23)
CALCIUM SERPL-MCNC: 9.5 MG/DL (ref 8.6–10.3)
CARDIAC TROPONIN I PNL SERPL HS: 6 NG/L (ref 0–20)
CARDIAC TROPONIN I PNL SERPL HS: 7 NG/L (ref 0–20)
CHLORIDE SERPL-SCNC: 101 MMOL/L (ref 98–107)
CO2 SERPL-SCNC: 24 MMOL/L (ref 21–32)
COLOR UR: ABNORMAL
CREAT SERPL-MCNC: 0.95 MG/DL (ref 0.5–1.3)
EGFRCR SERPLBLD CKD-EPI 2021: 86 ML/MIN/1.73M*2
EOSINOPHIL # BLD AUTO: 0.12 X10*3/UL (ref 0–0.7)
EOSINOPHIL NFR BLD AUTO: 1 %
ERYTHROCYTE [DISTWIDTH] IN BLOOD BY AUTOMATED COUNT: 13.8 % (ref 11.5–14.5)
FLUAV RNA RESP QL NAA+PROBE: NOT DETECTED
FLUBV RNA RESP QL NAA+PROBE: NOT DETECTED
GLUCOSE SERPL-MCNC: 104 MG/DL (ref 74–99)
GLUCOSE UR STRIP.AUTO-MCNC: NORMAL MG/DL
HCT VFR BLD AUTO: 43.4 % (ref 41–52)
HGB BLD-MCNC: 14.7 G/DL (ref 13.5–17.5)
HOLD SPECIMEN: NORMAL
IMM GRANULOCYTES # BLD AUTO: 0.03 X10*3/UL (ref 0–0.7)
IMM GRANULOCYTES NFR BLD AUTO: 0.2 % (ref 0–0.9)
KETONES UR STRIP.AUTO-MCNC: NEGATIVE MG/DL
LACTATE SERPL-SCNC: 1.3 MMOL/L (ref 0.4–2)
LEUKOCYTE ESTERASE UR QL STRIP.AUTO: NEGATIVE
LYMPHOCYTES # BLD AUTO: 0.92 X10*3/UL (ref 1.2–4.8)
LYMPHOCYTES NFR BLD AUTO: 7.3 %
MAGNESIUM SERPL-MCNC: 2.03 MG/DL (ref 1.6–2.4)
MCH RBC QN AUTO: 29.6 PG (ref 26–34)
MCHC RBC AUTO-ENTMCNC: 33.9 G/DL (ref 32–36)
MCV RBC AUTO: 88 FL (ref 80–100)
MONOCYTES # BLD AUTO: 0.73 X10*3/UL (ref 0.1–1)
MONOCYTES NFR BLD AUTO: 5.8 %
MUCOUS THREADS #/AREA URNS AUTO: NORMAL /LPF
NEUTROPHILS # BLD AUTO: 10.73 X10*3/UL (ref 1.2–7.7)
NEUTROPHILS NFR BLD AUTO: 85.5 %
NITRITE UR QL STRIP.AUTO: NEGATIVE
NRBC BLD-RTO: 0 /100 WBCS (ref 0–0)
PH UR STRIP.AUTO: 7 [PH]
PLATELET # BLD AUTO: 222 X10*3/UL (ref 150–450)
POTASSIUM SERPL-SCNC: 4.1 MMOL/L (ref 3.5–5.3)
PROT SERPL-MCNC: 7.1 G/DL (ref 6.4–8.2)
PROT UR STRIP.AUTO-MCNC: ABNORMAL MG/DL
RBC # BLD AUTO: 4.96 X10*6/UL (ref 4.5–5.9)
RBC # UR STRIP.AUTO: NEGATIVE MG/DL
RBC #/AREA URNS AUTO: NORMAL /HPF
RSV RNA RESP QL NAA+PROBE: NOT DETECTED
SARS-COV-2 RNA RESP QL NAA+PROBE: NOT DETECTED
SODIUM SERPL-SCNC: 135 MMOL/L (ref 136–145)
SP GR UR STRIP.AUTO: >1.05
UROBILINOGEN UR STRIP.AUTO-MCNC: NORMAL MG/DL
WBC # BLD AUTO: 12.6 X10*3/UL (ref 4.4–11.3)
WBC #/AREA URNS AUTO: NORMAL /HPF

## 2025-04-12 PROCEDURE — 99285 EMERGENCY DEPT VISIT HI MDM: CPT | Performed by: EMERGENCY MEDICINE

## 2025-04-12 PROCEDURE — 71275 CT ANGIOGRAPHY CHEST: CPT | Performed by: RADIOLOGY

## 2025-04-12 PROCEDURE — 2500000004 HC RX 250 GENERAL PHARMACY W/ HCPCS (ALT 636 FOR OP/ED)

## 2025-04-12 PROCEDURE — 83605 ASSAY OF LACTIC ACID: CPT

## 2025-04-12 PROCEDURE — 71045 X-RAY EXAM CHEST 1 VIEW: CPT

## 2025-04-12 PROCEDURE — 2550000001 HC RX 255 CONTRASTS: Performed by: EMERGENCY MEDICINE

## 2025-04-12 PROCEDURE — 93005 ELECTROCARDIOGRAM TRACING: CPT | Mod: 76

## 2025-04-12 PROCEDURE — 84484 ASSAY OF TROPONIN QUANT: CPT

## 2025-04-12 PROCEDURE — 96360 HYDRATION IV INFUSION INIT: CPT | Performed by: EMERGENCY MEDICINE

## 2025-04-12 PROCEDURE — 87040 BLOOD CULTURE FOR BACTERIA: CPT | Mod: STJLAB

## 2025-04-12 PROCEDURE — 99285 EMERGENCY DEPT VISIT HI MDM: CPT | Mod: 25 | Performed by: EMERGENCY MEDICINE

## 2025-04-12 PROCEDURE — 85025 COMPLETE CBC W/AUTO DIFF WBC: CPT

## 2025-04-12 PROCEDURE — 83735 ASSAY OF MAGNESIUM: CPT

## 2025-04-12 PROCEDURE — 36415 COLL VENOUS BLD VENIPUNCTURE: CPT

## 2025-04-12 PROCEDURE — 80053 COMPREHEN METABOLIC PANEL: CPT

## 2025-04-12 PROCEDURE — 93005 ELECTROCARDIOGRAM TRACING: CPT

## 2025-04-12 PROCEDURE — 96361 HYDRATE IV INFUSION ADD-ON: CPT | Performed by: EMERGENCY MEDICINE

## 2025-04-12 PROCEDURE — 81001 URINALYSIS AUTO W/SCOPE: CPT

## 2025-04-12 PROCEDURE — 71275 CT ANGIOGRAPHY CHEST: CPT

## 2025-04-12 PROCEDURE — 87637 SARSCOV2&INF A&B&RSV AMP PRB: CPT

## 2025-04-12 PROCEDURE — 71045 X-RAY EXAM CHEST 1 VIEW: CPT | Performed by: RADIOLOGY

## 2025-04-12 RX ORDER — AMOXICILLIN AND CLAVULANATE POTASSIUM 875; 125 MG/1; MG/1
1 TABLET, FILM COATED ORAL EVERY 12 HOURS
Qty: 14 TABLET | Refills: 0 | Status: SHIPPED | OUTPATIENT
Start: 2025-04-12 | End: 2025-04-16 | Stop reason: HOSPADM

## 2025-04-12 RX ADMIN — SODIUM CHLORIDE, SODIUM LACTATE, POTASSIUM CHLORIDE, AND CALCIUM CHLORIDE 1000 ML: .6; .31; .03; .02 INJECTION, SOLUTION INTRAVENOUS at 18:20

## 2025-04-12 RX ADMIN — IOHEXOL 75 ML: 350 INJECTION, SOLUTION INTRAVENOUS at 19:03

## 2025-04-12 ASSESSMENT — PAIN - FUNCTIONAL ASSESSMENT: PAIN_FUNCTIONAL_ASSESSMENT: 0-10

## 2025-04-12 ASSESSMENT — PAIN SCALES - GENERAL
PAINLEVEL_OUTOF10: 3
PAINLEVEL_OUTOF10: 5 - MODERATE PAIN
PAINLEVEL_OUTOF10: 5 - MODERATE PAIN

## 2025-04-12 ASSESSMENT — HEART SCORE
RISK FACTORS: >2 RISK FACTORS OR HX OF ATHEROSCLEROTIC DISEASE
AGE: 65+
HISTORY: SLIGHTLY SUSPICIOUS

## 2025-04-12 ASSESSMENT — COLUMBIA-SUICIDE SEVERITY RATING SCALE - C-SSRS
2. HAVE YOU ACTUALLY HAD ANY THOUGHTS OF KILLING YOURSELF?: NO
6. HAVE YOU EVER DONE ANYTHING, STARTED TO DO ANYTHING, OR PREPARED TO DO ANYTHING TO END YOUR LIFE?: NO
1. IN THE PAST MONTH, HAVE YOU WISHED YOU WERE DEAD OR WISHED YOU COULD GO TO SLEEP AND NOT WAKE UP?: NO

## 2025-04-12 ASSESSMENT — PAIN DESCRIPTION - LOCATION: LOCATION: GENERALIZED

## 2025-04-12 ASSESSMENT — PAIN DESCRIPTION - DESCRIPTORS
DESCRIPTORS: ACHING;DULL
DESCRIPTORS: ACHING

## 2025-04-12 NOTE — ED PROVIDER NOTES
Emergency Department Provider Note        History of Present Illness     History provided by: Patient  Limitations to History: None  External Records Reviewed with Brief Summary: Outpatient progress note from 10/11/2024 which showed patient's past medical history and PCP visit    HPI:  Bandar Castellanos is a 69 y.o. male past medical history significant for GERD, hyperlipidemia, essential tremor, hypertension, obesity presenting to the emergency department due to fever to 102 °F at home, body aches and chest pain for the last day.  Patient was diagnosed with an ear infection roughly 3 days ago and started on Keflex at a [facility] after having roughly 4 to 5 days of right ear pain.  The last 3 to 4 days he had worsening rigors, chills, sweats, body aches most significant in the chest.  Prior to presenting was febrile to 101-102.  Took Tylenol at 3 PM and ibuprofen at 4 PM.  Denies any hearing changes.  Denies any upper respiratory symptoms, no cough, congestion, sore throat, rhinorrhea.    Physical Exam   Triage vitals:  T 37 °C (98.6 °F)  HR (!) 113  /66  RR 18  O2 (!) 92 % None (Room air)    Physical Exam  Constitutional:       Appearance: He is well-developed. He is obese.   HENT:      Head: Normocephalic.      Right Ear: Hearing normal.      Left Ear: Hearing normal.      Ears:      Comments: No impacted cerumen, tympanic membrane on right side appears to have increased opacity compared to the left, no significant injection or bulging TM.  Cardiovascular:      Rate and Rhythm: Regular rhythm. Tachycardia present.      Heart sounds: Normal heart sounds.   Pulmonary:      Effort: Pulmonary effort is normal.      Breath sounds: Normal breath sounds.   Chest:      Chest wall: No mass.   Abdominal:      General: Abdomen is protuberant. Bowel sounds are normal.      Palpations: Abdomen is soft.      Tenderness: There is abdominal tenderness in the periumbilical area. There is no guarding or rebound.       Hernia: A hernia is present. Hernia is present in the umbilical area.      Comments: Tender periumbilical reducible hernia   Musculoskeletal:         General: Normal range of motion.      Cervical back: Normal range of motion.      Right lower leg: No edema.      Left lower leg: No edema.   Skin:     General: Skin is warm.      Capillary Refill: Capillary refill takes less than 2 seconds.   Neurological:      General: No focal deficit present.      Mental Status: He is alert.          Medical Decision Making & ED Course   Medical Decision Makin y.o. male with past medical history and HPI as described above presenting with chills, sweats, fever, body aches, chest pain.  Patient is tachycardic with a right bundle shaunna block on initial presentation, no prior EKGs available for comparison.  Given tachycardia and reported fever, septic workup was begun including CBC, CMP, troponin, lactate, mag, COVID flu and RSV PCR as well as a chest x-ray.  1 L of fluids started for this patient.  Ear exam largely unremarkable, therefore pursuing broad workup.  Chest x-ray notable for right infrahilar fullness, will obtain a CT for further clarification.  Done with PE study to help rule out pulmonary embolism additionally.  CBC notable for leukocytosis of 12.6 with a neutrophil predominance.  Troponin initially negative, COVID flu and RSV negative, lactate at 1.3.  Tachycardia did resolve following fluid bolus.  CT PE notable for multiple pulmonary nodules and significant dilation of the pulmonary artery concerning for pulmonary hypertension.  Also concerning for infectious bronchiolitis versus aspiration.  Given the small groundglass opacities we will treat as an infectious lung process with Augmentin that should cover any ear infection as well.  Given concern for pulmonary hypertension patient to receive a referral for cardiology for consideration of a right heart catheter further workup.  With patient's pulmonary nodules,  patient given referral to pulmonary nodule clinic.  Patient hemodynamically stable, all questions answered, discharged.    Differential diagnoses considered include but are not limited to: ACS, COVID, flu, RSV, otitis media     Social Determinants of Health which Significantly Impact Care: None identified The following actions were taken to address these social determinants: none    EKG Independent Interpretation: EKG interpreted by myself. Please see ED Course for full interpretation.    Independent Result Review and Interpretation: Relevant laboratory and radiographic results were reviewed and independently interpreted by myself.  As necessary, they are commented on in the ED Course.    Chronic conditions affecting the patient's care: As documented above in MDM    The patient was discussed with the following consultants/services: None    Care Considerations: As documented above in [facility]    ED Course:  ED Course as of 04/13/25 0156   Sat Apr 12, 2025 1821 EKG on my interpretation shows a right bundle branch block, normal axis, with sinus rhythm nearing tachycardia.  With ventricular rate of 102. [IS]   2018 CT angio chest for pulmonary embolism  CT angio notable for multiple pulmonary nodules as well as dilation of the main pulmonary artery concerning for pulmonary hypertension.  Also multiple small groundglass opacities concerning for infectious bronchiolitis versus aspiration. [IS]      ED Course User Index  [IS] Chaitanya Azul MD         Diagnoses as of 04/13/25 0156   Lung nodule   Pulmonary hypertension (Multi)   Fever, unspecified fever cause   Bronchiolitis     Disposition   Discharge    Procedures   Procedures    Patient seen and discussed with ED attending physician.    Chaitanya Azul MD  Emergency Medicine    =================Attending note===============    The patient was seen by the resident/fellow.  I have personally performed a substantive portion of the encounter.  I have seen and examined the  patient; agree with the workup, evaluation, MDM,   management and diagnosis.  The care plan has been discussed with the resident; I have reviewed the resident’s note and agree with the documented findings.      This is a 69 y.o. male who presents to ER with shaking chills and bodyaches that started today.  He is been having ear pain for last 3 or 4 days.  He had a Tmax of 104 degrees.  He was seen in urgent care and started on Keflex for a right ear infection.  He took Tylenol around 3 PM and ibuprofen around 4 PM.  Is been no cough or rhinorrhea.  No skin rashes.  No sore throat.  No chest pain or abdominal pain.  No sick contacts.  He has no history of heart failure.  Does have a history of GERD and hyperlipidemia.  No diabetes or hypertension.  Does have a history of tremors.    Heart is regular.  Lungs are clear.  Abdomen is soft and nontender.  He is in no distress.  No oral lesions.  No dental abscess  Left tympanic membrane is normal.  Right is slightly dull.  Not bulging and there is no erythema.  There is no mastoid tenderness.  There is some mild discomfort over his TMJ.  There is clicking when he open and closes his mouth.    White count minimally elevated at 12.6.  No anemia.    Since patient is mildly tachycardic and he has an elevated white count sepsis lab work was added on.  There is still no definitive source.    Chest x-ray: 1. Right infrahilar fullness. Although this may represent the   bronchovascular structures other etiologies are not excluded and   chest CT is recommended to further assess.   2. Linear left basilar opacities likely related to atelectasis.     Due to x-ray findings I did add on CT imaging of the chest.    EKG: Normal sinus rhythm with a ventricular rate of 99.  .  QTc 469.  Right bundle branch block.  No definitive ST elevations.  There is some downsloping ST segments laterally.  No old EKGs for comparison    Repeat EKG: Normal sinus rhythm with a ventricular rate of 94.   .  QTc 460.  Right bundle branch block.    CT:  1. Mildly limited study due to suboptimal bolus timing. No discrete  filling defects within the main pulmonary artery or its branches to  proximal segmental level. Please note that, assessment of distal  segmental and subsegmental branches is limited and small peripheral  emboli are not entirely excluded  2. Dilated main pulmonary artery measuring 3.5 cm which can be  associated with pulmonary hypertension.  3. Pulmonary nodules measuring up to 7 mm as described above.  Recommend follow-up CT chest in 6 months to document stability.  Incidental Finding:  A solid non-calcified pulmonary nodule measuring  6-8 mm .      Instructions:  Recommend follow up non contrast chest CT at 6-12  months, then consider CT chest at 18-24 months. (Kody Francois et  al., Guidelines for management of incidental pulmonary nodules  detected on CT images: From the Fleischner Society 2017, Radiology.  2017 Jul;284 (1):228-243.) [id number].[id number]  4. Enlarged mediastinal lymph nodes as described above that are  likely reactive. Attention on follow-up recommended.  5. Few small ground-glass pulmonary nodules are visualized in the  inferior aspect of the left upper and left lower lobes likely  representing mild aspiration or infectious bronchiolitis.        Patient was referred to cardiology and [facility] for the nodules.  He is also to follow-up with primary care.  He is given prescription for Augmentin.  This return to the nearest ER for any new or worsening symptoms.  He is satisfied this plan.      ==========================================       Chaitanya Azul MD  Resident  04/13/25 0156

## 2025-04-13 LAB
BACTERIA BLD CULT: NORMAL
BACTERIA BLD CULT: NORMAL
HOLD SPECIMEN: NORMAL

## 2025-04-13 NOTE — DISCHARGE INSTRUCTIONS
Seek immediate medical attention if you develop:  worsening fever or chills, cough, difficulty breathing, chest pain, shortness of breath, new or worsening nausea, new or worsening vomiting, new or worsening diarrhea, dizziness, lightheadedness, you are not eating or drinking well, you feel dehydrated, or you develop any new or worsening symptoms.    Follow up with your doctor(s) in one to two days.  Follow up and review all labs and imaging results with your doctor(s)    Please return to this or the nearest Emergency Medical facility for any new or worsening symptoms.    If you were given a prescription medication, you should review all risks and side effects on the package insert and discuss these and the medication with your pharmacist

## 2025-04-14 ENCOUNTER — APPOINTMENT (OUTPATIENT)
Dept: CARDIOLOGY | Facility: HOSPITAL | Age: 70
DRG: 152 | End: 2025-04-14
Payer: MEDICARE

## 2025-04-14 ENCOUNTER — APPOINTMENT (OUTPATIENT)
Dept: RADIOLOGY | Facility: HOSPITAL | Age: 70
DRG: 152 | End: 2025-04-14
Payer: MEDICARE

## 2025-04-14 ENCOUNTER — HOSPITAL ENCOUNTER (INPATIENT)
Facility: HOSPITAL | Age: 70
LOS: 2 days | Discharge: HOME | DRG: 152 | End: 2025-04-16
Attending: EMERGENCY MEDICINE | Admitting: STUDENT IN AN ORGANIZED HEALTH CARE EDUCATION/TRAINING PROGRAM
Payer: MEDICARE

## 2025-04-14 ENCOUNTER — DOCUMENTATION (OUTPATIENT)
Dept: HEMATOLOGY/ONCOLOGY | Facility: CLINIC | Age: 70
End: 2025-04-14

## 2025-04-14 ENCOUNTER — APPOINTMENT (OUTPATIENT)
Dept: PRIMARY CARE | Facility: CLINIC | Age: 70
End: 2025-04-14
Payer: MEDICARE

## 2025-04-14 VITALS
HEART RATE: 104 BPM | RESPIRATION RATE: 22 BRPM | SYSTOLIC BLOOD PRESSURE: 138 MMHG | TEMPERATURE: 102.6 F | WEIGHT: 296 LBS | OXYGEN SATURATION: 93 % | DIASTOLIC BLOOD PRESSURE: 80 MMHG | BODY MASS INDEX: 33.35 KG/M2

## 2025-04-14 DIAGNOSIS — K21.9 GASTROESOPHAGEAL REFLUX DISEASE WITHOUT ESOPHAGITIS: ICD-10-CM

## 2025-04-14 DIAGNOSIS — E78.1 HYPERTRIGLYCERIDEMIA: ICD-10-CM

## 2025-04-14 DIAGNOSIS — R91.8 MULTIPLE PULMONARY NODULES: Primary | ICD-10-CM

## 2025-04-14 DIAGNOSIS — G25.0 TREMOR, ESSENTIAL: ICD-10-CM

## 2025-04-14 DIAGNOSIS — R50.9 FEVER, UNSPECIFIED FEVER CAUSE: ICD-10-CM

## 2025-04-14 DIAGNOSIS — J18.9 COMMUNITY ACQUIRED PNEUMONIA, UNSPECIFIED LATERALITY: Primary | ICD-10-CM

## 2025-04-14 DIAGNOSIS — R68.89 INCREASED OXYGEN DEMAND: ICD-10-CM

## 2025-04-14 DIAGNOSIS — E78.2 MIXED HYPERLIPIDEMIA: ICD-10-CM

## 2025-04-14 LAB
ALBUMIN SERPL BCP-MCNC: 3.9 G/DL (ref 3.4–5)
ALP SERPL-CCNC: 82 U/L (ref 33–136)
ALT SERPL W P-5'-P-CCNC: 29 U/L (ref 10–52)
ANION GAP SERPL CALC-SCNC: 16 MMOL/L (ref 10–20)
APPEARANCE UR: CLEAR
AST SERPL W P-5'-P-CCNC: 24 U/L (ref 9–39)
BASOPHILS # BLD AUTO: 0.03 X10*3/UL (ref 0–0.1)
BASOPHILS NFR BLD AUTO: 0.3 %
BILIRUB SERPL-MCNC: 0.9 MG/DL (ref 0–1.2)
BILIRUB UR STRIP.AUTO-MCNC: NEGATIVE MG/DL
BNP SERPL-MCNC: 52 PG/ML (ref 0–99)
BUN SERPL-MCNC: 15 MG/DL (ref 6–23)
CALCIUM SERPL-MCNC: 9.2 MG/DL (ref 8.6–10.3)
CARDIAC TROPONIN I PNL SERPL HS: 10 NG/L (ref 0–20)
CARDIAC TROPONIN I PNL SERPL HS: 8 NG/L (ref 0–20)
CHLORIDE SERPL-SCNC: 100 MMOL/L (ref 98–107)
CO2 SERPL-SCNC: 22 MMOL/L (ref 21–32)
COLOR UR: YELLOW
CREAT SERPL-MCNC: 0.85 MG/DL (ref 0.5–1.3)
EGFRCR SERPLBLD CKD-EPI 2021: >90 ML/MIN/1.73M*2
EOSINOPHIL # BLD AUTO: 0.21 X10*3/UL (ref 0–0.7)
EOSINOPHIL NFR BLD AUTO: 1.9 %
ERYTHROCYTE [DISTWIDTH] IN BLOOD BY AUTOMATED COUNT: 14.5 % (ref 11.5–14.5)
FLUAV RNA RESP QL NAA+PROBE: NOT DETECTED
FLUBV RNA RESP QL NAA+PROBE: NOT DETECTED
GLUCOSE SERPL-MCNC: 106 MG/DL (ref 74–99)
GLUCOSE UR STRIP.AUTO-MCNC: NORMAL MG/DL
HCT VFR BLD AUTO: 42 % (ref 41–52)
HGB BLD-MCNC: 14.3 G/DL (ref 13.5–17.5)
HOLD SPECIMEN: NORMAL
IMM GRANULOCYTES # BLD AUTO: 0.03 X10*3/UL (ref 0–0.7)
IMM GRANULOCYTES NFR BLD AUTO: 0.3 % (ref 0–0.9)
KETONES UR STRIP.AUTO-MCNC: ABNORMAL MG/DL
LACTATE SERPL-SCNC: 0.8 MMOL/L (ref 0.4–2)
LEUKOCYTE ESTERASE UR QL STRIP.AUTO: NEGATIVE
LYMPHOCYTES # BLD AUTO: 0.73 X10*3/UL (ref 1.2–4.8)
LYMPHOCYTES NFR BLD AUTO: 6.6 %
MCH RBC QN AUTO: 29.6 PG (ref 26–34)
MCHC RBC AUTO-ENTMCNC: 34 G/DL (ref 32–36)
MCV RBC AUTO: 87 FL (ref 80–100)
MONOCYTES # BLD AUTO: 0.84 X10*3/UL (ref 0.1–1)
MONOCYTES NFR BLD AUTO: 7.6 %
MUCOUS THREADS #/AREA URNS AUTO: NORMAL /LPF
NEUTROPHILS # BLD AUTO: 9.25 X10*3/UL (ref 1.2–7.7)
NEUTROPHILS NFR BLD AUTO: 83.3 %
NITRITE UR QL STRIP.AUTO: NEGATIVE
NRBC BLD-RTO: 0 /100 WBCS (ref 0–0)
PH UR STRIP.AUTO: 5.5 [PH]
PLATELET # BLD AUTO: 204 X10*3/UL (ref 150–450)
POTASSIUM SERPL-SCNC: 4 MMOL/L (ref 3.5–5.3)
PROT SERPL-MCNC: 7.4 G/DL (ref 6.4–8.2)
PROT UR STRIP.AUTO-MCNC: ABNORMAL MG/DL
RBC # BLD AUTO: 4.83 X10*6/UL (ref 4.5–5.9)
RBC # UR STRIP.AUTO: NEGATIVE MG/DL
RBC #/AREA URNS AUTO: NORMAL /HPF
RSV RNA RESP QL NAA+PROBE: NOT DETECTED
SARS-COV-2 RNA RESP QL NAA+PROBE: NOT DETECTED
SODIUM SERPL-SCNC: 134 MMOL/L (ref 136–145)
SP GR UR STRIP.AUTO: 1.03
UROBILINOGEN UR STRIP.AUTO-MCNC: ABNORMAL MG/DL
WBC # BLD AUTO: 11.1 X10*3/UL (ref 4.4–11.3)
WBC #/AREA URNS AUTO: NORMAL /HPF

## 2025-04-14 PROCEDURE — 87449 NOS EACH ORGANISM AG IA: CPT | Mod: STJLAB

## 2025-04-14 PROCEDURE — 71045 X-RAY EXAM CHEST 1 VIEW: CPT | Performed by: STUDENT IN AN ORGANIZED HEALTH CARE EDUCATION/TRAINING PROGRAM

## 2025-04-14 PROCEDURE — 71045 X-RAY EXAM CHEST 1 VIEW: CPT | Performed by: RADIOLOGY

## 2025-04-14 PROCEDURE — 3075F SYST BP GE 130 - 139MM HG: CPT | Performed by: FAMILY MEDICINE

## 2025-04-14 PROCEDURE — 1123F ACP DISCUSS/DSCN MKR DOCD: CPT | Performed by: FAMILY MEDICINE

## 2025-04-14 PROCEDURE — G2211 COMPLEX E/M VISIT ADD ON: HCPCS | Performed by: FAMILY MEDICINE

## 2025-04-14 PROCEDURE — 83605 ASSAY OF LACTIC ACID: CPT | Performed by: EMERGENCY MEDICINE

## 2025-04-14 PROCEDURE — 1158F ADVNC CARE PLAN TLK DOCD: CPT | Performed by: FAMILY MEDICINE

## 2025-04-14 PROCEDURE — 87637 SARSCOV2&INF A&B&RSV AMP PRB: CPT | Performed by: EMERGENCY MEDICINE

## 2025-04-14 PROCEDURE — 93005 ELECTROCARDIOGRAM TRACING: CPT

## 2025-04-14 PROCEDURE — 84484 ASSAY OF TROPONIN QUANT: CPT | Performed by: STUDENT IN AN ORGANIZED HEALTH CARE EDUCATION/TRAINING PROGRAM

## 2025-04-14 PROCEDURE — 1159F MED LIST DOCD IN RCRD: CPT | Performed by: FAMILY MEDICINE

## 2025-04-14 PROCEDURE — 99214 OFFICE O/P EST MOD 30 MIN: CPT | Performed by: FAMILY MEDICINE

## 2025-04-14 PROCEDURE — 84145 PROCALCITONIN (PCT): CPT | Mod: STJLAB

## 2025-04-14 PROCEDURE — 2500000005 HC RX 250 GENERAL PHARMACY W/O HCPCS

## 2025-04-14 PROCEDURE — 99285 EMERGENCY DEPT VISIT HI MDM: CPT | Performed by: PHYSICIAN ASSISTANT

## 2025-04-14 PROCEDURE — 1157F ADVNC CARE PLAN IN RCRD: CPT | Performed by: FAMILY MEDICINE

## 2025-04-14 PROCEDURE — 87899 AGENT NOS ASSAY W/OPTIC: CPT | Mod: STJLAB

## 2025-04-14 PROCEDURE — 83880 ASSAY OF NATRIURETIC PEPTIDE: CPT | Performed by: STUDENT IN AN ORGANIZED HEALTH CARE EDUCATION/TRAINING PROGRAM

## 2025-04-14 PROCEDURE — 71045 X-RAY EXAM CHEST 1 VIEW: CPT

## 2025-04-14 PROCEDURE — 1160F RVW MEDS BY RX/DR IN RCRD: CPT | Performed by: FAMILY MEDICINE

## 2025-04-14 PROCEDURE — 2500000004 HC RX 250 GENERAL PHARMACY W/ HCPCS (ALT 636 FOR OP/ED)

## 2025-04-14 PROCEDURE — 84484 ASSAY OF TROPONIN QUANT: CPT | Performed by: EMERGENCY MEDICINE

## 2025-04-14 PROCEDURE — 83880 ASSAY OF NATRIURETIC PEPTIDE: CPT | Performed by: EMERGENCY MEDICINE

## 2025-04-14 PROCEDURE — 80053 COMPREHEN METABOLIC PANEL: CPT | Performed by: EMERGENCY MEDICINE

## 2025-04-14 PROCEDURE — 2500000001 HC RX 250 WO HCPCS SELF ADMINISTERED DRUGS (ALT 637 FOR MEDICARE OP): Performed by: PHYSICIAN ASSISTANT

## 2025-04-14 PROCEDURE — 83605 ASSAY OF LACTIC ACID: CPT | Performed by: STUDENT IN AN ORGANIZED HEALTH CARE EDUCATION/TRAINING PROGRAM

## 2025-04-14 PROCEDURE — 81001 URINALYSIS AUTO W/SCOPE: CPT | Performed by: EMERGENCY MEDICINE

## 2025-04-14 PROCEDURE — 2500000002 HC RX 250 W HCPCS SELF ADMINISTERED DRUGS (ALT 637 FOR MEDICARE OP, ALT 636 FOR OP/ED)

## 2025-04-14 PROCEDURE — 87040 BLOOD CULTURE FOR BACTERIA: CPT | Mod: STJLAB | Performed by: PHYSICIAN ASSISTANT

## 2025-04-14 PROCEDURE — 36415 COLL VENOUS BLD VENIPUNCTURE: CPT | Performed by: STUDENT IN AN ORGANIZED HEALTH CARE EDUCATION/TRAINING PROGRAM

## 2025-04-14 PROCEDURE — 87637 SARSCOV2&INF A&B&RSV AMP PRB: CPT | Performed by: STUDENT IN AN ORGANIZED HEALTH CARE EDUCATION/TRAINING PROGRAM

## 2025-04-14 PROCEDURE — 96375 TX/PRO/DX INJ NEW DRUG ADDON: CPT

## 2025-04-14 PROCEDURE — 2500000005 HC RX 250 GENERAL PHARMACY W/O HCPCS: Performed by: STUDENT IN AN ORGANIZED HEALTH CARE EDUCATION/TRAINING PROGRAM

## 2025-04-14 PROCEDURE — 96365 THER/PROPH/DIAG IV INF INIT: CPT | Mod: 59

## 2025-04-14 PROCEDURE — 36415 COLL VENOUS BLD VENIPUNCTURE: CPT | Performed by: PHYSICIAN ASSISTANT

## 2025-04-14 PROCEDURE — 2500000004 HC RX 250 GENERAL PHARMACY W/ HCPCS (ALT 636 FOR OP/ED): Performed by: PHYSICIAN ASSISTANT

## 2025-04-14 PROCEDURE — 80053 COMPREHEN METABOLIC PANEL: CPT | Performed by: STUDENT IN AN ORGANIZED HEALTH CARE EDUCATION/TRAINING PROGRAM

## 2025-04-14 PROCEDURE — 85025 COMPLETE CBC W/AUTO DIFF WBC: CPT | Performed by: EMERGENCY MEDICINE

## 2025-04-14 PROCEDURE — 85025 COMPLETE CBC W/AUTO DIFF WBC: CPT | Performed by: STUDENT IN AN ORGANIZED HEALTH CARE EDUCATION/TRAINING PROGRAM

## 2025-04-14 PROCEDURE — 96361 HYDRATE IV INFUSION ADD-ON: CPT

## 2025-04-14 PROCEDURE — 1200000002 HC GENERAL ROOM WITH TELEMETRY DAILY

## 2025-04-14 PROCEDURE — 96372 THER/PROPH/DIAG INJ SC/IM: CPT

## 2025-04-14 PROCEDURE — 87632 RESP VIRUS 6-11 TARGETS: CPT

## 2025-04-14 PROCEDURE — 99285 EMERGENCY DEPT VISIT HI MDM: CPT | Performed by: EMERGENCY MEDICINE

## 2025-04-14 PROCEDURE — 3079F DIAST BP 80-89 MM HG: CPT | Performed by: FAMILY MEDICINE

## 2025-04-14 PROCEDURE — 2500000001 HC RX 250 WO HCPCS SELF ADMINISTERED DRUGS (ALT 637 FOR MEDICARE OP)

## 2025-04-14 RX ORDER — IBUPROFEN 600 MG/1
600 TABLET ORAL ONCE
Status: DISCONTINUED | OUTPATIENT
Start: 2025-04-14 | End: 2025-04-14

## 2025-04-14 RX ORDER — ENOXAPARIN SODIUM 100 MG/ML
40 INJECTION SUBCUTANEOUS EVERY 24 HOURS
Status: DISCONTINUED | OUTPATIENT
Start: 2025-04-14 | End: 2025-04-16 | Stop reason: HOSPADM

## 2025-04-14 RX ORDER — PANTOPRAZOLE SODIUM 40 MG/1
40 TABLET, DELAYED RELEASE ORAL
Status: DISCONTINUED | OUTPATIENT
Start: 2025-04-15 | End: 2025-04-16 | Stop reason: HOSPADM

## 2025-04-14 RX ORDER — ATORVASTATIN CALCIUM 20 MG/1
20 TABLET, FILM COATED ORAL DAILY
Status: DISCONTINUED | OUTPATIENT
Start: 2025-04-15 | End: 2025-04-16 | Stop reason: HOSPADM

## 2025-04-14 RX ORDER — IPRATROPIUM BROMIDE AND ALBUTEROL SULFATE 2.5; .5 MG/3ML; MG/3ML
3 SOLUTION RESPIRATORY (INHALATION) EVERY 4 HOURS PRN
Status: DISCONTINUED | OUTPATIENT
Start: 2025-04-14 | End: 2025-04-16 | Stop reason: HOSPADM

## 2025-04-14 RX ORDER — ONDANSETRON HYDROCHLORIDE 2 MG/ML
4 INJECTION, SOLUTION INTRAVENOUS ONCE
Status: COMPLETED | OUTPATIENT
Start: 2025-04-14 | End: 2025-04-14

## 2025-04-14 RX ORDER — LABETALOL HYDROCHLORIDE 5 MG/ML
5 INJECTION, SOLUTION INTRAVENOUS ONCE
Status: COMPLETED | OUTPATIENT
Start: 2025-04-14 | End: 2025-04-14

## 2025-04-14 RX ORDER — PRIMIDONE 50 MG/1
50 TABLET ORAL NIGHTLY
Status: DISCONTINUED | OUTPATIENT
Start: 2025-04-14 | End: 2025-04-16 | Stop reason: HOSPADM

## 2025-04-14 RX ORDER — ACETAMINOPHEN 325 MG/1
975 TABLET ORAL ONCE
Status: COMPLETED | OUTPATIENT
Start: 2025-04-14 | End: 2025-04-14

## 2025-04-14 RX ORDER — OMEPRAZOLE 40 MG/1
40 CAPSULE, DELAYED RELEASE ORAL
Qty: 90 CAPSULE | Refills: 1 | Status: CANCELLED | OUTPATIENT
Start: 2025-04-14

## 2025-04-14 RX ORDER — ACETAMINOPHEN 325 MG/1
650 TABLET ORAL EVERY 6 HOURS PRN
Status: DISCONTINUED | OUTPATIENT
Start: 2025-04-14 | End: 2025-04-16 | Stop reason: HOSPADM

## 2025-04-14 RX ORDER — CEFTRIAXONE 2 G/50ML
2 INJECTION, SOLUTION INTRAVENOUS EVERY 24 HOURS
Status: DISCONTINUED | OUTPATIENT
Start: 2025-04-15 | End: 2025-04-16 | Stop reason: HOSPADM

## 2025-04-14 RX ORDER — POLYETHYLENE GLYCOL 3350 17 G/17G
17 POWDER, FOR SOLUTION ORAL DAILY
Status: DISCONTINUED | OUTPATIENT
Start: 2025-04-14 | End: 2025-04-16 | Stop reason: HOSPADM

## 2025-04-14 RX ORDER — AZITHROMYCIN 500 MG/1
500 TABLET, FILM COATED ORAL
Status: DISCONTINUED | OUTPATIENT
Start: 2025-04-14 | End: 2025-04-15

## 2025-04-14 RX ADMIN — ENOXAPARIN SODIUM 40 MG: 40 INJECTION SUBCUTANEOUS at 19:26

## 2025-04-14 RX ADMIN — SODIUM CHLORIDE, SODIUM LACTATE, POTASSIUM CHLORIDE, AND CALCIUM CHLORIDE 1000 ML: .6; .31; .03; .02 INJECTION, SOLUTION INTRAVENOUS at 16:02

## 2025-04-14 RX ADMIN — Medication 2 L/MIN: at 21:48

## 2025-04-14 RX ADMIN — PRIMIDONE 50 MG: 50 TABLET ORAL at 21:34

## 2025-04-14 RX ADMIN — CEFTRIAXONE 2 G: 2 INJECTION, POWDER, FOR SOLUTION INTRAMUSCULAR; INTRAVENOUS at 16:02

## 2025-04-14 RX ADMIN — LABETALOL HYDROCHLORIDE 5 MG: 5 INJECTION, SOLUTION INTRAVENOUS at 21:34

## 2025-04-14 RX ADMIN — POLYETHYLENE GLYCOL 3350 17 G: 17 POWDER, FOR SOLUTION ORAL at 19:25

## 2025-04-14 RX ADMIN — ONDANSETRON 4 MG: 2 INJECTION INTRAMUSCULAR; INTRAVENOUS at 16:01

## 2025-04-14 RX ADMIN — ACETAMINOPHEN 650 MG: 325 TABLET ORAL at 22:13

## 2025-04-14 RX ADMIN — ACETAMINOPHEN 975 MG: 325 TABLET ORAL at 16:01

## 2025-04-14 RX ADMIN — Medication 3 L/MIN: at 15:23

## 2025-04-14 RX ADMIN — AZITHROMYCIN DIHYDRATE 500 MG: 500 TABLET ORAL at 19:26

## 2025-04-14 RX ADMIN — AZITHROMYCIN MONOHYDRATE 500 MG: 500 INJECTION, POWDER, LYOPHILIZED, FOR SOLUTION INTRAVENOUS at 17:25

## 2025-04-14 RX ADMIN — IPRATROPIUM BROMIDE AND ALBUTEROL SULFATE 3 ML: 2.5; .5 SOLUTION RESPIRATORY (INHALATION) at 21:40

## 2025-04-14 SDOH — ECONOMIC STABILITY: HOUSING INSECURITY: AT ANY TIME IN THE PAST 12 MONTHS, WERE YOU HOMELESS OR LIVING IN A SHELTER (INCLUDING NOW)?: NO

## 2025-04-14 SDOH — SOCIAL STABILITY: SOCIAL INSECURITY: WITHIN THE LAST YEAR, HAVE YOU BEEN AFRAID OF YOUR PARTNER OR EX-PARTNER?: NO

## 2025-04-14 SDOH — SOCIAL STABILITY: SOCIAL INSECURITY: HAVE YOU HAD ANY THOUGHTS OF HARMING ANYONE ELSE?: NO

## 2025-04-14 SDOH — ECONOMIC STABILITY: FOOD INSECURITY: WITHIN THE PAST 12 MONTHS, THE FOOD YOU BOUGHT JUST DIDN'T LAST AND YOU DIDN'T HAVE MONEY TO GET MORE.: NEVER TRUE

## 2025-04-14 SDOH — SOCIAL STABILITY: SOCIAL INSECURITY: DO YOU FEEL ANYONE HAS EXPLOITED OR TAKEN ADVANTAGE OF YOU FINANCIALLY OR OF YOUR PERSONAL PROPERTY?: NO

## 2025-04-14 SDOH — ECONOMIC STABILITY: INCOME INSECURITY: IN THE PAST 12 MONTHS HAS THE ELECTRIC, GAS, OIL, OR WATER COMPANY THREATENED TO SHUT OFF SERVICES IN YOUR HOME?: NO

## 2025-04-14 SDOH — ECONOMIC STABILITY: FOOD INSECURITY: WITHIN THE PAST 12 MONTHS, YOU WORRIED THAT YOUR FOOD WOULD RUN OUT BEFORE YOU GOT THE MONEY TO BUY MORE.: NEVER TRUE

## 2025-04-14 SDOH — SOCIAL STABILITY: SOCIAL INSECURITY: ARE YOU OR HAVE YOU BEEN THREATENED OR ABUSED PHYSICALLY, EMOTIONALLY, OR SEXUALLY BY ANYONE?: NO

## 2025-04-14 SDOH — SOCIAL STABILITY: SOCIAL INSECURITY
WITHIN THE LAST YEAR, HAVE YOU BEEN KICKED, HIT, SLAPPED, OR OTHERWISE PHYSICALLY HURT BY YOUR PARTNER OR EX-PARTNER?: NO

## 2025-04-14 SDOH — SOCIAL STABILITY: SOCIAL INSECURITY
WITHIN THE LAST YEAR, HAVE YOU BEEN RAPED OR FORCED TO HAVE ANY KIND OF SEXUAL ACTIVITY BY YOUR PARTNER OR EX-PARTNER?: NO

## 2025-04-14 SDOH — ECONOMIC STABILITY: HOUSING INSECURITY: IN THE LAST 12 MONTHS, WAS THERE A TIME WHEN YOU WERE NOT ABLE TO PAY THE MORTGAGE OR RENT ON TIME?: NO

## 2025-04-14 SDOH — ECONOMIC STABILITY: FOOD INSECURITY: HOW HARD IS IT FOR YOU TO PAY FOR THE VERY BASICS LIKE FOOD, HOUSING, MEDICAL CARE, AND HEATING?: NOT HARD AT ALL

## 2025-04-14 SDOH — SOCIAL STABILITY: SOCIAL INSECURITY: WITHIN THE LAST YEAR, HAVE YOU BEEN HUMILIATED OR EMOTIONALLY ABUSED IN OTHER WAYS BY YOUR PARTNER OR EX-PARTNER?: NO

## 2025-04-14 SDOH — ECONOMIC STABILITY: HOUSING INSECURITY: IN THE PAST 12 MONTHS, HOW MANY TIMES HAVE YOU MOVED WHERE YOU WERE LIVING?: 0

## 2025-04-14 SDOH — SOCIAL STABILITY: SOCIAL INSECURITY: HAS ANYONE EVER THREATENED TO HURT YOUR FAMILY OR YOUR PETS?: NO

## 2025-04-14 SDOH — SOCIAL STABILITY: SOCIAL INSECURITY: DO YOU FEEL UNSAFE GOING BACK TO THE PLACE WHERE YOU ARE LIVING?: NO

## 2025-04-14 SDOH — SOCIAL STABILITY: SOCIAL INSECURITY: ABUSE: ADULT

## 2025-04-14 SDOH — SOCIAL STABILITY: SOCIAL INSECURITY: ARE THERE ANY APPARENT SIGNS OF INJURIES/BEHAVIORS THAT COULD BE RELATED TO ABUSE/NEGLECT?: NO

## 2025-04-14 SDOH — SOCIAL STABILITY: SOCIAL INSECURITY: DOES ANYONE TRY TO KEEP YOU FROM HAVING/CONTACTING OTHER FRIENDS OR DOING THINGS OUTSIDE YOUR HOME?: NO

## 2025-04-14 SDOH — ECONOMIC STABILITY: TRANSPORTATION INSECURITY: IN THE PAST 12 MONTHS, HAS LACK OF TRANSPORTATION KEPT YOU FROM MEDICAL APPOINTMENTS OR FROM GETTING MEDICATIONS?: NO

## 2025-04-14 SDOH — SOCIAL STABILITY: SOCIAL INSECURITY: HAVE YOU HAD THOUGHTS OF HARMING ANYONE ELSE?: NO

## 2025-04-14 ASSESSMENT — ACTIVITIES OF DAILY LIVING (ADL)
PATIENT'S MEMORY ADEQUATE TO SAFELY COMPLETE DAILY ACTIVITIES?: YES
ADEQUATE_TO_COMPLETE_ADL: YES
HEARING - RIGHT EAR: FUNCTIONAL
DRESSING YOURSELF: INDEPENDENT
LACK_OF_TRANSPORTATION: NO
JUDGMENT_ADEQUATE_SAFELY_COMPLETE_DAILY_ACTIVITIES: YES
WALKS IN HOME: INDEPENDENT
GROOMING: INDEPENDENT
LACK_OF_TRANSPORTATION: NO
BATHING: INDEPENDENT
HEARING - LEFT EAR: FUNCTIONAL
FEEDING YOURSELF: INDEPENDENT
TOILETING: INDEPENDENT

## 2025-04-14 ASSESSMENT — PATIENT HEALTH QUESTIONNAIRE - PHQ9
1. LITTLE INTEREST OR PLEASURE IN DOING THINGS: NOT AT ALL
SUM OF ALL RESPONSES TO PHQ9 QUESTIONS 1 & 2: 0
2. FEELING DOWN, DEPRESSED OR HOPELESS: NOT AT ALL

## 2025-04-14 ASSESSMENT — COGNITIVE AND FUNCTIONAL STATUS - GENERAL
CLIMB 3 TO 5 STEPS WITH RAILING: A LITTLE
MOBILITY SCORE: 23
PATIENT BASELINE BEDBOUND: NO
MOBILITY SCORE: 23
DAILY ACTIVITIY SCORE: 24
CLIMB 3 TO 5 STEPS WITH RAILING: A LITTLE
DAILY ACTIVITIY SCORE: 24

## 2025-04-14 ASSESSMENT — PAIN - FUNCTIONAL ASSESSMENT
PAIN_FUNCTIONAL_ASSESSMENT: 0-10
PAIN_FUNCTIONAL_ASSESSMENT: 0-10

## 2025-04-14 ASSESSMENT — LIFESTYLE VARIABLES
HOW OFTEN DO YOU HAVE 6 OR MORE DRINKS ON ONE OCCASION: NEVER
TOTAL SCORE: 0
HAVE PEOPLE ANNOYED YOU BY CRITICIZING YOUR DRINKING: NO
AUDIT-C TOTAL SCORE: 0
HOW OFTEN DO YOU HAVE A DRINK CONTAINING ALCOHOL: NEVER
AUDIT-C TOTAL SCORE: 0
EVER FELT BAD OR GUILTY ABOUT YOUR DRINKING: NO
HOW MANY STANDARD DRINKS CONTAINING ALCOHOL DO YOU HAVE ON A TYPICAL DAY: PATIENT DOES NOT DRINK
SKIP TO QUESTIONS 9-10: 1
EVER HAD A DRINK FIRST THING IN THE MORNING TO STEADY YOUR NERVES TO GET RID OF A HANGOVER: NO
HAVE YOU EVER FELT YOU SHOULD CUT DOWN ON YOUR DRINKING: NO

## 2025-04-14 ASSESSMENT — PAIN SCALES - GENERAL
PAINLEVEL_OUTOF10: 8
PAINLEVEL_OUTOF10: 3
PAINLEVEL_OUTOF10: 2

## 2025-04-14 ASSESSMENT — PAIN DESCRIPTION - PAIN TYPE: TYPE: ACUTE PAIN

## 2025-04-14 ASSESSMENT — PAIN DESCRIPTION - LOCATION: LOCATION: HEAD

## 2025-04-14 ASSESSMENT — PAIN SCALES - PAIN ASSESSMENT IN ADVANCED DEMENTIA (PAINAD): TOTALSCORE: MEDICATION (SEE MAR)

## 2025-04-14 NOTE — HOSPITAL COURSE
Senior resident addendum:    Patient is a 69-year-old male with PMH significant for HTN, HLD, GERD, suspected PAH.  Patient presented chief complaint of worsening flulike symptoms after being seen in his PCPs office today following ER visit on 4/12/2024.  Patient initially came to [facility] ED 2 days ago for chief complaint of fevers of 104 °F at home as well as body aches, chest pain.  Of note he was initially diagnosed with an ear infection on 4/9/2025 for which he was on Keflex for, and after which subsequently developed the aforementioned symptoms.  All PCR's were negative, they got a CT angio for pulmonary embolus showing multiple pulmonary nodules and significant dilation of the pulmonary artery concerning for PAH.  It was also concerning for infectious bronchiolitis versus aspiration.  He had a leukocytosis neutrophilic in nature at that time, ultimately patient was discharged home with a prescription for Augmentin.  Now, over the past 2 days he has been having shortness of breath on top of the previously mentioned symptoms with TMAX 102.8F.  He did not  his prescription until 4/13/2025 and has only been taking the Augmentin for approximately 24 hours.  In the emergency department patient was febrile at 102 °F, tachycardic at 111, respiratory rate 20, /66, SpO2 90% RA-97% on 2 L via NC.  Patient's labs significant for: Odium 134, WBC 11.1 (ANC 9.25), COVID/flu/RSV PCR all negative.  Imaging obtained consisted of a CXR as patient had a CT angio for pulmonary embolus 2 days prior.  This illustrated atelectatic changes of the left lung base questionable small left basilar pleural effusion.  No definite focal pulmonary consolidation.  Patient was given Tylenol 975 mg for the fever, azithromycin 500 mg, Rocephin 2 g, LR 1000 mL, and Zofran 4 mg and placed on 2 L via NC.  Patient will be admitted to the internal medicine service for failure of outpatient antimicrobial therapy likely in the setting of  pneumonia.  Patient's physical exam was significant for on otoscope examination of right aural canal there was no erythema signs of infection, however tympanum nondistended with erythema surrounding the ossicle, light reflex present.  Palpation of the mastoids bilaterally were nontender, remainder of patient's physical exam was unremarkable with exception to crackles in the left lung base. For fever we will continue Tylenol 650 mg Q6 as needed and will also cover for mild pain/body aches.  For questionable pneumonia/leukocytosis/ear infection we will continue azithromycin 500 mg daily and Rocephin 2 g daily, with likely taper to Augmentin on dc as it is difficult to say if patient truly failed outpatient antibiotic therapy as he only received 24 hours of Augmentin as he has not picked up this medication.  Blood cultures have been collected and are pending, will also obtain a sputum culture if possible.  Will send strep/Legionella urine antigens as well as viral respiratory panel.  Will order PT/OT, for evaluation and ambulatory pulse ox to evaluate patient's need for home-going oxygen on discharge.  For patient's ear infection he reports marked improvement as he initially presented for pain to an urgent care however this has resolved, and he feels that his ear is much better so, at this time we will defer imaging of head.  Patient's home medications will also be ordered as appropriate for his chronic comorbidities.     Of note went to evaluate the patient at bedside for hypertension reported 214/81, with manual blood pressure 182/90 with  with increased work of breathing as well as headache.  As patient has symptomatic hypertension we will give 5 mg of IV labetalol, obtain a chest x-ray to evaluate increased work of breathing in the setting of severe hypertension, and give DuoNeb breathing treatment.  During this timeframe patient did not require any additional supplemental oxygen and remained on 3 L via nasal  cannula however respiratory rate increased and patient had grossly increased work of breathing without desaturation.    Patient to be staffed with attending physician.    CODE STATUS: Full code    Marquis Bouren M.D.  Internal Medicine PGY-2

## 2025-04-14 NOTE — ASSESSMENT & PLAN NOTE
Condition seems well controlled.  No change in current treatment.  Orders:    atorvastatin (Lipitor) 20 mg tablet; Take 1 tablet (20 mg) by mouth once daily.

## 2025-04-14 NOTE — ASSESSMENT & PLAN NOTE
Condition seems well controlled.  No change in current treatment.  Orders:    primidone (Mysoline) 50 mg tablet; Take 1 tablet (50 mg) by mouth once daily at bedtime.

## 2025-04-14 NOTE — PROGRESS NOTES
Referral placed to [facility] by Dr. Knight, [facility] ED, for multiple lung nodules, noted incidentally on CT chest imaging 4/12/25. Referral placed to pulmonary, for the incidentals lung nodule navigators to reach out to patient to discuss CT surveillance to ensure stability of the nodules.  COLIN Sales.CNP  [facility]

## 2025-04-14 NOTE — H&P
Internal Medicine    Admission H&P      Patient Bandar Castellanos PCP René Mckenna MD    MRN [id number]  Admission Date 4/14/2025      Chief Complaint/Reason for Admission:  Bandar is a 69 y.o. male who presented today with SOB    Subjective    Subjective   History of Presenting Illness  Mr. Bandar Castellanos is a 69 y.o. male with PMH significant for HTN, HLD, GERD and essential tremors who presented to [facility] from his PCP's office with worsening SOB and fever. Patient seen and examined with wife at bedside in the emergency department and is a good historian. Patient had an right ear infection a week ago and was prescribed Keflex at a [facility], which he took for 5 days with resolution of right earache. He presented to [facility] ED on 4/12 with fever of 102F oral, chills, diaphoresis, and bodyaches.  He was discharged with which he picked up around 11 AM on 4/13.  He has completed 3 doses of Augmentin so far.  He had a follow-up appointment at his primary care's office this morning and was encouraged to present to ED for evaluation after being found to be saturating at 93% on room air and febrile with temperature of 102.6F.  Patient reports his earache has resolved.  He reports having minimal cough some but endorses being more short of breath with exertion recently.  He denies having headache, blurry vision, rhinorrhea, chest pain, palpitations, abdominal pain, N/V, or bilateral lower extremity edema.  Of note, he was found to have multiple lung nodules measuring up to 7 mm on CTA chest 4/12 and has been referred to [facility] for surveillance.  He has also been referred to cardiology for suspected pulmonary hypertension.    ED course:  VS: /66  Pulse 111  Temp 38.9 °C  SpO2 90% on RA  RR 20  Labs: CMP grossly unremarkable, hyponatremia 134, CBC notable for no leukocytosis, elevated absolute neutrophils 9.25, H&H: 14.3/42, lactate 0.8, BNP 52, troponins: 8->10, blood cultures collected  EKG:  NSR with HR of 88bpm, RBBB, Qtc of 445 ms   Imaging: CXR showed mild atelectatic changes of the left lung base. Questionable small left basilar pleural effusion. No definite focal pulmonary consolidation  Intervention: Azithromycin and Rocephin, Zofran, Tylenol, 1 L of LR bolus    Patient was admitted to [facility] under teaching for further management of pneumonia.    Past Medical History  Active Ambulatory Problems     Diagnosis Date Noted    Calcaneal spur of left foot 02/02/2023    ED (erectile dysfunction) 02/02/2023    Mixed hyperlipidemia 02/02/2023    Hypertriglyceridemia 02/02/2023    Incisional hernia, without obstruction or gangrene 02/02/2023    Piriformis syndrome, left 02/02/2023    Psoriasis, guttate 02/02/2023    Tremor, essential 02/02/2023    Rectus diastasis 02/02/2023    Lumbosacral spondylosis without myelopathy 01/07/2020    Sebaceous cyst 04/07/2015    Obesity, unspecified 04/07/2015    Primary hypertension 04/07/2015    Esophageal dysphagia 04/09/2024    Gastroesophageal reflux disease without esophagitis 04/09/2024    Multiple nevi 04/09/2024    Calcaneal spur 09/09/2024     Resolved Ambulatory Problems     Diagnosis Date Noted    Foot pain 02/02/2023    Heartburn 02/02/2023    Hordeolum external 02/02/2023    Lumbar back pain 02/02/2023    Plantar fasciitis, left 02/02/2023    Umbilical hernia 02/02/2023    Tremor 02/02/2023    Sprain of ankle, left 02/02/2023     Past Medical History:   Diagnosis Date    GERD (gastroesophageal reflux disease)     Hyperlipidemia     Other specified health status        Past Surgical History   Past Surgical History:   Procedure Laterality Date    OTHER SURGICAL HISTORY  11/30/2018    Colonoscopy       Social History  Smoking history significant for 10 pack years.  Social alcohol use.  No recreational drug use.    Home Medication:  Prior to Admission medications    Medication Sig Start Date End Date Taking? Authorizing Provider   amoxicillin-pot clavulanate  "(Augmentin) 875-125 mg tablet Take 1 tablet by mouth every 12 hours for 7 days. 4/12/25 4/19/25 Yes Ephraim Knight,    atorvastatin (Lipitor) 20 mg tablet Take 1 tablet (20 mg) by mouth once daily. 10/11/24  Yes René Mckenna MD   magnesium 250 mg tablet Take by mouth 2 times a day.   Yes Historical Provider, MD   multivitamin capsule Take by mouth. 11/30/18  Yes Historical Provider, MD   omega 3-dha-epa-fish oil (Fish OiL) 1,000 mg (120 mg-180 mg) capsule Take by mouth.   Yes Historical Provider, MD   omeprazole (PriLOSEC) 40 mg DR capsule Take 1 capsule (40 mg) by mouth once daily in the morning. Take before meals. Do not crush or chew.  Patient taking differently: Take 1 capsule (40 mg) by mouth once daily as needed (indigestion). Do not crush or chew. 10/11/24  Yes René Mckenna MD   primidone (Mysoline) 50 mg tablet Take 1 tablet (50 mg) by mouth once daily at bedtime. 10/11/24  Yes René Mckenna MD        Family History  No relevant family history.    Allergies:  Allergies   Allergen Reactions    Fenofibrate Unknown        Review of System:  12-system ROS negative except as documented in HPI    Objective    Objective   Vital Signs:  Visit Vitals  /74   Pulse 84   Temp (!) 38.9 °C (102 °F) (Temporal)   Resp (!) 25   Ht 2.007 m (6' 7\")   Wt 135 kg (297 lb)   SpO2 97%   BMI 33.46 kg/m²   Smoking Status Former   BSA 2.74 m²        Physical Examination:  General: A&Ox4, alert, coopertive, not in acute distress, resting comfortably in bed upon examination   HEENT: Normocephalic, atraumatic, EOMI, moist mucous membranes, R TM slightly erythematous on otoscope exam, no tenderness of palpation in surrounding area  Neck: Neck supple, trachea midline, no evidence of trauma  Cardiovascular: RRR, S1 & S2 appreciated, no murmurs, rubs, or gallops appreciated, distal pulses 2+ bilaterally (radial and dorsalis pedis)  Respiratory: no respiratory distress, no wheezing, crackles on left lung base, saturating at " 97% on 2L NC   Abdomen: Soft, nondistended, nontender to palpation, +bowel sounds, no guarding rigidity or rebound tenderness  MSK: No joint swelling, normal movements of all extremities.   Neuro: No focal deficits, normal motor function, normal sensation, follows all commands. No asterixis noted  Skin: Warm and dry, no lesions, contusions, or erythema.  Psychiatric: Judgment intact.  Appropriate mood, affect and behavior.    Laboratory Data:  Results for orders placed or performed during the hospital encounter of 04/14/25 (from the past 24 hours)   Sars-CoV-2, Influenza A/B and RSV PCR   Result Value Ref Range    Coronavirus 2019, PCR Not Detected Not Detected    Flu A Result Not Detected Not Detected    Flu B Result Not Detected Not Detected    RSV PCR Not Detected Not Detected   CBC with Differential   Result Value Ref Range    WBC 11.1 4.4 - 11.3 x10*3/uL    nRBC 0.0 0.0 - 0.0 /100 WBCs    RBC 4.83 4.50 - 5.90 x10*6/uL    Hemoglobin 14.3 13.5 - 17.5 g/dL    Hematocrit 42.0 41.0 - 52.0 %    MCV 87 80 - 100 fL    MCH 29.6 26.0 - 34.0 pg    MCHC 34.0 32.0 - 36.0 g/dL    RDW 14.5 11.5 - 14.5 %    Platelets 204 150 - 450 x10*3/uL    Neutrophils % 83.3 40.0 - 80.0 %    Immature Granulocytes %, Automated 0.3 0.0 - 0.9 %    Lymphocytes % 6.6 13.0 - 44.0 %    Monocytes % 7.6 2.0 - 10.0 %    Eosinophils % 1.9 0.0 - 6.0 %    Basophils % 0.3 0.0 - 2.0 %    Neutrophils Absolute 9.25 (H) 1.20 - 7.70 x10*3/uL    Immature Granulocytes Absolute, Automated 0.03 0.00 - 0.70 x10*3/uL    Lymphocytes Absolute 0.73 (L) 1.20 - 4.80 x10*3/uL    Monocytes Absolute 0.84 0.10 - 1.00 x10*3/uL    Eosinophils Absolute 0.21 0.00 - 0.70 x10*3/uL    Basophils Absolute 0.03 0.00 - 0.10 x10*3/uL   Comprehensive Metabolic Panel   Result Value Ref Range    Glucose 106 (H) 74 - 99 mg/dL    Sodium 134 (L) 136 - 145 mmol/L    Potassium 4.0 3.5 - 5.3 mmol/L    Chloride 100 98 - 107 mmol/L    Bicarbonate 22 21 - 32 mmol/L    Anion Gap 16 10 - 20 mmol/L     Urea Nitrogen 15 6 - 23 mg/dL    Creatinine 0.85 0.50 - 1.30 mg/dL    eGFR >90 >60 mL/min/1.73m*2    Calcium 9.2 8.6 - 10.3 mg/dL    Albumin 3.9 3.4 - 5.0 g/dL    Alkaline Phosphatase 82 33 - 136 U/L    Total Protein 7.4 6.4 - 8.2 g/dL    AST 24 9 - 39 U/L    Bilirubin, Total 0.9 0.0 - 1.2 mg/dL    ALT 29 10 - 52 U/L   Brain Natriuretic Peptide   Result Value Ref Range    BNP 52 0 - 99 pg/mL   Troponin I, High Sensitivity, Initial   Result Value Ref Range    Troponin I, High Sensitivity 8 0 - 20 ng/L   Lactate   Result Value Ref Range    Lactate 0.8 0.4 - 2.0 mmol/L   Light Blue Top   Result Value Ref Range    Extra Tube Hold for add-ons.    Troponin, High Sensitivity, 1 Hour   Result Value Ref Range    Troponin I, High Sensitivity 10 0 - 20 ng/L       Imaging:  Imaging  XR chest 1 view    Result Date: 4/14/2025  1. Mild atelectatic changes of the left lung base. Questionable small left basilar pleural effusion. 2. No definite focal pulmonary consolidation   MACRO: None.   Signed by: Celena Heredia 4/14/2025 4:17 PM Dictation workstation:   [id number]     Cardiology, Vascular, and Other Imaging  No other imaging results found for the past 2 days       Medications:  Scheduled medications  [START ON 4/15/2025] atorvastatin, 20 mg, oral, Daily  azithromycin, 500 mg, oral, q24h TIEN  [START ON 4/15/2025] cefTRIAXone, 2 g, intravenous, q24h  enoxaparin, 40 mg, subcutaneous, q24h  [START ON 4/15/2025] pantoprazole, 40 mg, oral, Daily before breakfast  polyethylene glycol, 17 g, oral, Daily  primidone, 50 mg, oral, Nightly      Continuous medications     PRN medications  PRN medications: acetaminophen, oxygen  Assessment    Assessment & Plan   Mr. Bandar Castellanos is a 69 y.o. male with PMH significant for HTN, HLD, GERD and essential tremors who presented to [facility] from his PCP's office with worsening SOB and fever. Febrile at 38.9C and SpO2 of 90% on RA upon arrival. CXR at presentation did not show any definite  focal pulmonary consolidation, there is a questionable small basilar pleural effusion with mild atelectatic changes of the left lung base. He was admitted to [facility] for further management of CAP.  Assessment & Plan  Community acquired pneumonia, unspecified laterality    #CAP  #AHRF 2/2 above  -Febrile on presentation without leukocytosis on CBC, however, he's been on oral antibiotics outpatient for several days   -He had a CTA chest on 4/12 that showed dilated main pulmonary artery 3.5 cm and pulmonary nodules up to 7 mm.   -CXR today did not show any definite focal pulmonary consolidation, mild atelectatic changes of the left lung base present with questionable small left basilar pleural effusion  -CURB 65 score of 1 for age, low risk with 2.7% 30 day mortality   -Low suspicion for mastoiditis at this time given resolution of earache and lack of TTP on physical exam   Plan:  - Legionella, strep pneumo urine AG, sputum culture, procalcitonin, and viral respiratory panel pending  - Blood cultures pending  - Rocephin 2g IV/zithromax started to cover for pneumonia and ear infection   - Tylenol q6h prn for fevers  - Wean off supplemental O2 as tolerated while maintaining SpO2 > 92%  - Incentive spirometry  - PT/OT consulted    CHRONIC PROBLEMS:  # HTN  # HLD  # GERD  # Essential tremors  - Continue home medications as appropriate    Global Plan of Care  IVF: as needed  Electrolytes: Replete with goal K>4 and Mg>2  Diet: Regular  DVT prophylaxis: Lovenox  Bowel regime: MiraLAX  Consults: PT/OT  Code Status: Full Code     Dispo: Dissipate additional 1-2 midnight stays pending clinical improvement and further workup    Patient seen and discussed with senior resident. To be staffed with attending.    Chrissie Jasmine, DO  Internal Medicine, PGY 1  April 14, 2025    Senior resident addendum:    Patient is a 69-year-old male with PMH significant for HTN, HLD, GERD, suspected PAH.  Patient presented chief complaint of worsening  flulike symptoms after being seen in his PCPs office today following ER visit on 4/12/2024.  Patient initially came to [facility] ED 2 days ago for chief complaint of fevers of 104 °F at home as well as body aches, chest pain.  Of note he was initially diagnosed with an ear infection on 4/9/2025 for which he was on Keflex for, and after which subsequently developed the aforementioned symptoms.  All PCR's were negative, they got a CT angio for pulmonary embolus showing multiple pulmonary nodules and significant dilation of the pulmonary artery concerning for PAH.  It was also concerning for infectious bronchiolitis versus aspiration.  He had a leukocytosis neutrophilic in nature at that time, ultimately patient was discharged home with a prescription for Augmentin.  Now, over the past 2 days he has been having shortness of breath on top of the previously mentioned symptoms with TMAX 102.8F.  He did not  his prescription until 4/13/2025 and has only been taking the Augmentin for approximately 24 hours.  In the emergency department patient was febrile at 102 °F, tachycardic at 111, respiratory rate 20, /66, SpO2 90% RA-97% on 2 L via NC.  Patient's labs significant for: Odium 134, WBC 11.1 (ANC 9.25), COVID/flu/RSV PCR all negative.  Imaging obtained consisted of a CXR as patient had a CT angio for pulmonary embolus 2 days prior.  This illustrated atelectatic changes of the left lung base questionable small left basilar pleural effusion.  No definite focal pulmonary consolidation.  Patient was given Tylenol 975 mg for the fever, azithromycin 500 mg, Rocephin 2 g, LR 1000 mL, and Zofran 4 mg and placed on 2 L via NC.  Patient will be admitted to the internal medicine service for failure of outpatient antimicrobial therapy likely in the setting of pneumonia.  Patient's physical exam was significant for on otoscope examination of right aural canal there was no erythema signs of infection, however tympanum  nondistended with erythema surrounding the ossicle, light reflex present.  Palpation of the mastoids bilaterally were nontender, remainder of patient's physical exam was unremarkable with exception to crackles in the left lung base. For fever we will continue Tylenol 650 mg Q6 as needed and will also cover for mild pain/body aches.  For questionable pneumonia/leukocytosis/ear infection we will continue azithromycin 500 mg daily and Rocephin 2 g daily, with likely taper to Augmentin on dc as it is difficult to say if patient truly failed outpatient antibiotic therapy as he only received 24 hours of Augmentin as he has not picked up this medication.  Blood cultures have been collected and are pending, will also obtain a sputum culture if possible.  Will send strep/Legionella urine antigens as well as viral respiratory panel.  Will order PT/OT, for evaluation and ambulatory pulse ox to evaluate patient's need for home-going oxygen on discharge.  For patient's ear infection he reports marked improvement as he initially presented for pain to an urgent care however this has resolved, and he feels that his ear is much better so, at this time we will defer imaging of head.  Patient's home medications will also be ordered as appropriate for his chronic comorbidities.     Of note went to evaluate the patient at bedside for hypertension reported 214/81, with manual blood pressure 182/90 with  with increased work of breathing as well as headache.  As patient has symptomatic hypertension we will give 5 mg of IV labetalol, obtain a chest x-ray to evaluate increased work of breathing in the setting of severe hypertension, and give DuoNeb breathing treatment.  During this timeframe patient did not require any additional supplemental oxygen and remained on 3 L via nasal cannula however respiratory rate increased and patient had grossly increased work of breathing without desaturation.    Patient to be staffed with attending  physician.    CODE STATUS: Full code    Marquis Bourne M.D.  Internal Medicine PGY-2

## 2025-04-14 NOTE — ED PROVIDER NOTES
"Emergency Department Encounter  [facility] [place] [facility]    Patient: Bandar Castellanos  MRN: [id number]  : 1955  Date of Evaluation: 2025  ED Provider: Laura Raya PA-C        History of Present Illness     This is a 69-year-old male with past medical history of hyperlipidemia, GERD, essential tremor as well as suspected pulmonary hypertension who presents to the ED with worsening shortness of breath.  Patient states that over the past several days he has been feeling short of breath, having nasal congestion, rhinorrhea, decreased p.o. intake, nausea, vomiting as well as a productive cough with white sputum.  He was seen here in the ED 2 days ago with URI symptoms as well as ear pain.  He was discharged on Augmentin at that time he was diagnosed with pneumonia as well as an ear infection and discharged on Augmentin.  He states he only started taking this yesterday due to his pharmacy being closed.  He states that despite taking this antibiotic his symptoms have been worsening.  He states that he went to his primary care provider's office today and was referred to the ED due to worsening symptoms.  Patient reportedly had low O2 sats at his primary care provider's office.  On arrival to the ED patient was febrile.  He feels as if his symptoms are worsening.  He does state that his ear pain has resolved.  He denies any headache or pain behind his ears bilaterally.  He states that this feels worse than when he has had pneumonia in the past.               Visit Vitals  /66 (BP Location: Right arm, Patient Position: Sitting)   Pulse 86   Temp (!) 38.9 °C (102 °F) (Temporal)   Resp 20   Ht 2.007 m (6' 7\")   Wt 135 kg (297 lb)   SpO2 96%   BMI 33.46 kg/m²   Smoking Status Former   BSA 2.74 m²          Physical Exam       Triage vitals:  T (!) 38.9 °C (102 °F)  HR (!) 111  /66  RR 20  O2 (!) 90 % None (Room air)    Physical Exam     Physical exam:   General: Vitals noted, " no distress. +febrile.  Diaphoretic  EENT:  Hearing grossly intact. Normal phonation. MMM. Airway patient. PERRL. EOMI.   Neck: No midline tenderness or paraspinal tenderness. FROM.   Cardiac: Tachycardic, regular rhythm.  Normal S1 and S2.  No murmurs, gallops, rubs.   Pulmonary: On 2 L oxygen via nasal cannula good air exchange. Lungs clear bilaterally. No wheezes, rhonchi, rales. No accessory muscle use.   Abdomen: Soft, nonsurgical. Nontender. No peritoneal signs.   Back: No CVA tenderness. No midline tenderness or paraspinal tenderness. No obvious deformity or step off.   Extremities: No peripheral edema.  Full range of motion. Moves all extremities freely. No tenderness throughout extremities.   Skin: No rash. Warm and Dry.   Neuro: No focal neurologic deficits. CN 2-12 grossly intact. Sensation equal bilaterally. No weakness.       Results       Labs Reviewed   CBC WITH AUTO DIFFERENTIAL - Abnormal       Result Value    WBC 11.1      nRBC 0.0      RBC 4.83      Hemoglobin 14.3      Hematocrit 42.0      MCV 87      MCH 29.6      MCHC 34.0      RDW 14.5      Platelets 204      Neutrophils % 83.3      Immature Granulocytes %, Automated 0.3      Lymphocytes % 6.6      Monocytes % 7.6      Eosinophils % 1.9      Basophils % 0.3      Neutrophils Absolute 9.25 (*)     Immature Granulocytes Absolute, Automated 0.03      Lymphocytes Absolute 0.73 (*)     Monocytes Absolute 0.84      Eosinophils Absolute 0.21      Basophils Absolute 0.03     COMPREHENSIVE METABOLIC PANEL - Abnormal    Glucose 106 (*)     Sodium 134 (*)     Potassium 4.0      Chloride 100      Bicarbonate 22      Anion Gap 16      Urea Nitrogen 15      Creatinine 0.85      eGFR >90      Calcium 9.2      Albumin 3.9      Alkaline Phosphatase 82      Total Protein 7.4      AST 24      Bilirubin, Total 0.9      ALT 29     B-TYPE NATRIURETIC PEPTIDE - Normal    BNP 52      Narrative:        <100 pg/mL - Heart failure unlikely  100-299 pg/mL - Intermediate  probability of acute heart                  failure exacerbation. Correlate with clinical                  context and patient history.    >=300 pg/mL - Heart Failure likely. Correlate with clinical                  context and patient history.    BNP testing is performed using different testing methodology at [facility] than at other [facility] hospitals. Direct result comparisons should only be made within the same method.      SARS-COV-2, INFLUENZA A/B AND RSV PCR - Normal    Coronavirus 2019, PCR Not Detected      Flu A Result Not Detected      Flu B Result Not Detected      RSV PCR Not Detected      Narrative:     This assay is an FDA-cleared, in vitro diagnostic nucleic acid amplification test for the qualitative detection and differentiation of SARS CoV-2/ Influenza A/B/ RSV from nasopharyngeal specimens collected from individuals with signs and symptoms of respiratory tract infections, and has been validated for use at [facility]. Negative results do not preclude COVID-19/ Influenza A/B/ RSV infections and should not be used as the sole basis for diagnosis, treatment, or other management decisions. Testing for SARS CoV-2 is recommended only for patients who meet current clinical and/or epidemiological criteria defined by federal, state, or local public health directives.   SERIAL TROPONIN-INITIAL - Normal    Troponin I, High Sensitivity 8      Narrative:     Less than 99th percentile of normal range cutoff-  Female and children under 18 years old <14 ng/L; Male <21 ng/L: Negative  Repeat testing should be performed if clinically indicated.     Female and children under 18 years old 14-50 ng/L; Male 21-50 ng/L:  Consistent with possible cardiac damage and possible increased clinical   risk. Serial measurements may help to assess extent of myocardial damage.     >50 ng/L: Consistent with cardiac damage, increased clinical risk and  myocardial infarction. Serial measurements  may help assess extent of   myocardial damage.      NOTE: Children less than 1 year old may have higher baseline troponin   levels and results should be interpreted in conjunction with the overall   clinical context.     NOTE: Troponin I testing is performed using a different   testing methodology at [facility] than at other   [facility]. Direct result comparisons should only   be made within the same method.   LACTATE - Normal    Lactate 0.8      Narrative:     Venipuncture immediately after or during the administration of Metamizole may lead to falsely low results. Testing should be performed immediately prior to Metamizole dosing.   SERIAL TROPONIN, 1 HOUR - Normal    Troponin I, High Sensitivity 10      Narrative:     Less than 99th percentile of normal range cutoff-  Female and children under 18 years old <14 ng/L; Male <21 ng/L: Negative  Repeat testing should be performed if clinically indicated.     Female and children under 18 years old 14-50 ng/L; Male 21-50 ng/L:  Consistent with possible cardiac damage and possible increased clinical   risk. Serial measurements may help to assess extent of myocardial damage.     >50 ng/L: Consistent with cardiac damage, increased clinical risk and  myocardial infarction. Serial measurements may help assess extent of   myocardial damage.      NOTE: Children less than 1 year old may have higher baseline troponin   levels and results should be interpreted in conjunction with the overall   clinical context.     NOTE: Troponin I testing is performed using a different   testing methodology at [facility] than at other   [facility]. Direct result comparisons should only   be made within the same method.   BLOOD CULTURE   BLOOD CULTURE   TROPONIN SERIES- (INITIAL, 1 HR)    Narrative:     The following orders were created for panel order Troponin I Series, High Sensitivity (0, 1 HR).  Procedure                               Abnormality          Status                     ---------                               -----------         ------                     Troponin I, High Sensiti...[[id number]]  Normal              Final result               Troponin, High Sensitivi...[[id number]]  Normal              Final result                 Please view results for these tests on the individual orders.   URINALYSIS WITH REFLEX CULTURE AND MICROSCOPIC    Narrative:     The following orders were created for panel order Urinalysis with Reflex Culture and Microscopic.  Procedure                               Abnormality         Status                     ---------                               -----------         ------                     Urinalysis with Reflex C...[[id number]]                                                 Extra Urine Gray Tube[[id number]]                                                         Please view results for these tests on the individual orders.   URINALYSIS WITH REFLEX CULTURE AND MICROSCOPIC   EXTRA URINE GRAY TUBE   URINALYSIS WITH REFLEX CULTURE AND MICROSCOPIC    Narrative:     The following orders were created for panel order Urinalysis with Reflex Culture and Microscopic.  Procedure                               Abnormality         Status                     ---------                               -----------         ------                     Urinalysis with Reflex C...[[id number]]                                                 Extra Urine Gray Tube[[id number]]                                                         Please view results for these tests on the individual orders.   URINALYSIS WITH REFLEX CULTURE AND MICROSCOPIC   EXTRA URINE GRAY TUBE       XR chest 1 view   Final Result   1. Mild atelectatic changes of the left lung base. Questionable small   left basilar pleural effusion.   2. No definite focal pulmonary consolidation        MACRO:   None.        Signed by: Celena Heredia 4/14/2025 4:17 PM   Dictation workstation:    [id number]            Medical Decision Making & ED Course         ED Course & MDM     Medical Decision Making  This is a 69-year-old male with past medical history of hypertension, GERD, essential tremor as well as suspected pulmonary hypertension and pulmonary nodules who presents to the ED with worsening flulike symptoms.  Vitals that show patient was tachycardic as well as febrile on arrival to the ED with a temperature of 102 °F.  SpO2 low at 90% on room air.  Patient placed on 2 L of oxygen via nasal cannula with significant improvement.  On examination patient was febrile and diaphoretic.  He was tachycardic.  Lungs are clear to auscultation.  Abdomen soft and nontender.  Due to concern for sepsis with patient's abnormal vitals laboratory studies obtained as well as cultures.  Chest x-ray ordered.  Rocephin, azithromycin, IV fluids, Tylenol and Zofran ordered for patient's symptoms.  Patient's recent ED visit reviewed which did show findings on CT scan concerning for pulmonary hypertension as well as pulmonary nodules.  Viral swabs obtained today and were negative.  Troponin within normal limits at 8.  Repeat normal at 10.  BNP 52.  Lactate normal at 0.8.  CMP grossly unremarkable.  CBC within normal limits with a WBC of 11.1.  Chest x-ray showed questionable small left basilar pleural effusion as well as mild atelectatic changes at the left lung base.  Due to the patient's worsening symptoms despite oral antibiotics as well as his new O2 requirement decision was made to admit the patient for IV antibiotics for pneumonia.  Patient agreeable to this plan.  Patient discussed with medicine team and was accepted for admission.         ED Course as of 04/14/25 1833   Mon Apr 14, 2025   1652 EKG independently interpreted by myself: NSR, HR 88, normal axis, RBBB with  MS, no STEMI, no significant changes from prior [FF]   1654 Patient with pneumonia, suspected source, is being treated for pneumonia with past  2 days with antibiotics by mouth but any worsening symptoms.  Noted to be hypoxic, mostly at 90%, but dips into the 80s as well.  Started on  submental oxygen which is new for the patient.  Plan for admission for IV antibiotics for pneumonia. [FF]      ED Course User Index  [FF] Jack Ortega MD         Diagnoses as of 04/14/25 1833   Community acquired pneumonia, unspecified laterality   Increased oxygen demand   Fever, unspecified fever cause           EKG Independent Interpretation: EKG interpreted by myself. Please see ED Course for full interpretation.    Independent Result Review and Interpretation: Chest X-Ray as interpreted by me revealed infiltrate to left lower lung base    The patient was discussed with the following consultants/services: Hospitalist/Admitting Provider who accepted the patient for admission        Disposition   As a result of their workup, the patient will require admission to the hospital.  The patient was informed of his diagnosis.  The patient was given the opportunity to ask questions and I answered them. The patient agreed to be admitted to the hospital.    Procedures     This was a shared visit with an ED attending.  The patient was seen and discussed with the ED attending    Procedures    Laura Raya PA-C  Emergency Medicine      Laura Raya PA-C  04/14/25 1837

## 2025-04-14 NOTE — PROGRESS NOTES
Bandar Castellanos is a 69 y.o. male here today for   Chief Complaint   Patient presents with    Hyperlipidemia    GERD    Tremors    ER Follow-up     4/12        HPI     Patient was seen at the emergency room 2 days ago and diagnosed with possible bronchiolitis versus early pneumonia.  He was prescribed Augmentin.  He is here today with his wife for recheck.  They report URI sxs worse.  Still with fever.  SOB and a bad productive cough.  P ox low in office at 93% on room air.  Not eating anything for 2 days.  Poor fluid intake.  He says he feels a little lightheaded and dizzy and his mouth is dry.      No current facility-administered medications for this visit.    Current Outpatient Medications:     atorvastatin (Lipitor) 20 mg tablet, Take 1 tablet (20 mg) by mouth once daily., Disp: 90 tablet, Rfl: 1    primidone (Mysoline) 50 mg tablet, Take 1 tablet (50 mg) by mouth once daily at bedtime., Disp: 90 tablet, Rfl: 1    Facility-Administered Medications Ordered in Other Visits:     acetaminophen (Tylenol) tablet 650 mg, 650 mg, oral, q6h PRN, Chrissie Jasmine DO, 650 mg at 04/15/25 0414    atorvastatin (Lipitor) tablet 20 mg, 20 mg, oral, Daily, Chrissie Jasmine DO    azithromycin (Zithromax) tablet 500 mg, 500 mg, oral, q24h TIEN, Chrissie Jasmine DO, 500 mg at 04/14/25 1926    cefTRIAXone (Rocephin) 2 g in dextrose (iso) IV 50 mL, 2 g, intravenous, q24h, Chrissie Jasmnie DO    enoxaparin (Lovenox) syringe 40 mg, 40 mg, subcutaneous, q24h, Chrissie Jasmine DO, 40 mg at 04/14/25 1926    ipratropium-albuteroL (Duo-Neb) 0.5-2.5 mg/3 mL nebulizer solution 3 mL, 3 mL, nebulization, q4h PRN, Chrissie Jasmine DO, 3 mL at 04/14/25 2140    oxygen (O2) therapy, , inhalation, Continuous PRN - O2/gases, Chrissie Jasmine DO, 2 L/min at 04/14/25 2148    pantoprazole (ProtoNix) EC tablet 40 mg, 40 mg, oral, Daily before breakfast, Chrissie Jasmine DO, 40 mg at 04/15/25 0527    polyethylene glycol (Glycolax, Miralax) packet 17 g,  17 g, oral, Daily, Chrissie GRADY Jasmine DO, 17 g at 04/14/25 1925    primidone (Mysoline) tablet 50 mg, 50 mg, oral, Nightly, Chrissie RASMUSSEN DO [place], 50 mg at 04/14/25 2134    Patient Active Problem List   Diagnosis    Calcaneal spur of left foot    ED (erectile dysfunction)    Mixed hyperlipidemia    Hypertriglyceridemia    Incisional hernia, without obstruction or gangrene    Piriformis syndrome, left    Psoriasis, guttate    Tremor, essential    Rectus diastasis    Lumbosacral spondylosis without myelopathy    Sebaceous cyst    Obesity, unspecified    Primary hypertension    Esophageal dysphagia    Gastroesophageal reflux disease without esophagitis    Multiple nevi    Calcaneal spur    Community acquired pneumonia, unspecified laterality         Objective    Visit Vitals    Visit Vitals  /80   Pulse 104   Temp (!) 39.2 °C (102.6 °F)   Resp 22   Wt 134 kg (296 lb)   SpO2 93%   BMI 33.35 kg/m²   Smoking Status Former   BSA 2.73 m²       Body mass index is 33.35 kg/m².     Physical Exam     General -  Cooperative, Not in acute distress.    Skin - Warm and dry with no rashes.    Eye - Bilateral - pupils equal and round, sclera clear and lids pink without edema or mass.  Sclera and conjunctiva - bilateral - Normal.    ENMT - Left -TM pearly grey with good light reflex and externa auditory canal pink and dry.              Right -TM pearly grey with good light relflex and external auditory canal pink and dry.    Oropharynx - moist and pink, tonsils normal, no erythema noted.    Nose  - Nasal mucosa - no purulent discharge.    Sinuses -- Frontal - no tenderness observed.  Maxillary - no tenderness observed.  Ethmoid - no tenderness observed.    Lungs -there are scattered wheezes and some upper respiratory congestion.  And normal breathing effort.  Even and easy respiratory effort with no use of accessory muscles.  I do not hear any rales or rhonchi.    Heart -- RRR and no murmurs, rubs or thrill    Lymphatic - Cervical  nodes normal with no enlargement.    Assessment & Plan  Community acquired pneumonia, unspecified laterality  I suspect the patient may have community-acquired pneumonia with some hypoxia and poor intake.  His clinical condition is worsening even on oral antibiotics.  I discussed this with him and his wife and I recommend that they take him immediately to the emergency room at [facility] for further evaluation and possible admission.  They understand and agree.  His wife is going to drive and he is hemodynamically stable.  I want him to follow-up with me in about 4 to 5 days if he is hospitalized or if he is not hospitalized.    I called the emergency room at [facility] and spoke with one of the physicians and gave them a clinical update and reviewed my concerns.       Mixed hyperlipidemia  Condition seems well controlled.  No change in current treatment.  Orders:    atorvastatin (Lipitor) 20 mg tablet; Take 1 tablet (20 mg) by mouth once daily.    Hypertriglyceridemia    Orders:    atorvastatin (Lipitor) 20 mg tablet; Take 1 tablet (20 mg) by mouth once daily.    Gastroesophageal reflux disease without esophagitis  Condition seems well controlled.  No change in current treatment.       Tremor, essential  Condition seems well controlled.  No change in current treatment.  Orders:    primidone (Mysoline) 50 mg tablet; Take 1 tablet (50 mg) by mouth once daily at bedtime.         Orders Placed This Encounter      atorvastatin (Lipitor) 20 mg tablet      primidone (Mysoline) 50 mg tablet       No orders of the defined types were placed in this encounter.       New Medications Ordered This Visit   Medications    atorvastatin (Lipitor) 20 mg tablet     Sig: Take 1 tablet (20 mg) by mouth once daily.     Dispense:  90 tablet     Refill:  1    primidone (Mysoline) 50 mg tablet     Sig: Take 1 tablet (50 mg) by mouth once daily at bedtime.     Dispense:  90 tablet     Refill:  1

## 2025-04-15 LAB
ALBUMIN SERPL BCP-MCNC: 3.3 G/DL (ref 3.4–5)
ANION GAP SERPL CALC-SCNC: 15 MMOL/L (ref 10–20)
BUN SERPL-MCNC: 14 MG/DL (ref 6–23)
CALCIUM SERPL-MCNC: 8.5 MG/DL (ref 8.6–10.3)
CHLORIDE SERPL-SCNC: 101 MMOL/L (ref 98–107)
CO2 SERPL-SCNC: 23 MMOL/L (ref 21–32)
CREAT SERPL-MCNC: 0.88 MG/DL (ref 0.5–1.3)
EGFRCR SERPLBLD CKD-EPI 2021: >90 ML/MIN/1.73M*2
ERYTHROCYTE [DISTWIDTH] IN BLOOD BY AUTOMATED COUNT: 14.5 % (ref 11.5–14.5)
GLUCOSE SERPL-MCNC: 108 MG/DL (ref 74–99)
HCT VFR BLD AUTO: 36.9 % (ref 41–52)
HGB BLD-MCNC: 12.2 G/DL (ref 13.5–17.5)
HOLD SPECIMEN: NORMAL
HOLD SPECIMEN: NORMAL
LEGIONELLA AG UR QL: NEGATIVE
MAGNESIUM SERPL-MCNC: 2.09 MG/DL (ref 1.6–2.4)
MCH RBC QN AUTO: 29.5 PG (ref 26–34)
MCHC RBC AUTO-ENTMCNC: 33.1 G/DL (ref 32–36)
MCV RBC AUTO: 89 FL (ref 80–100)
NRBC BLD-RTO: 0 /100 WBCS (ref 0–0)
PHOSPHATE SERPL-MCNC: 2.9 MG/DL (ref 2.5–4.9)
PLATELET # BLD AUTO: 191 X10*3/UL (ref 150–450)
POTASSIUM SERPL-SCNC: 3.7 MMOL/L (ref 3.5–5.3)
PROCALCITONIN SERPL-MCNC: 0.17 NG/ML
RBC # BLD AUTO: 4.13 X10*6/UL (ref 4.5–5.9)
S PNEUM AG UR QL: NEGATIVE
SODIUM SERPL-SCNC: 135 MMOL/L (ref 136–145)
WBC # BLD AUTO: 8.5 X10*3/UL (ref 4.4–11.3)

## 2025-04-15 PROCEDURE — 83735 ASSAY OF MAGNESIUM: CPT

## 2025-04-15 PROCEDURE — 2500000002 HC RX 250 W HCPCS SELF ADMINISTERED DRUGS (ALT 637 FOR MEDICARE OP, ALT 636 FOR OP/ED)

## 2025-04-15 PROCEDURE — 80069 RENAL FUNCTION PANEL: CPT

## 2025-04-15 PROCEDURE — 2500000001 HC RX 250 WO HCPCS SELF ADMINISTERED DRUGS (ALT 637 FOR MEDICARE OP)

## 2025-04-15 PROCEDURE — 2500000005 HC RX 250 GENERAL PHARMACY W/O HCPCS

## 2025-04-15 PROCEDURE — 1200000002 HC GENERAL ROOM WITH TELEMETRY DAILY

## 2025-04-15 PROCEDURE — 36415 COLL VENOUS BLD VENIPUNCTURE: CPT | Performed by: STUDENT IN AN ORGANIZED HEALTH CARE EDUCATION/TRAINING PROGRAM

## 2025-04-15 PROCEDURE — 36415 COLL VENOUS BLD VENIPUNCTURE: CPT

## 2025-04-15 PROCEDURE — 2500000004 HC RX 250 GENERAL PHARMACY W/ HCPCS (ALT 636 FOR OP/ED)

## 2025-04-15 PROCEDURE — 99222 1ST HOSP IP/OBS MODERATE 55: CPT

## 2025-04-15 PROCEDURE — 85027 COMPLETE CBC AUTOMATED: CPT

## 2025-04-15 RX ORDER — ATORVASTATIN CALCIUM 20 MG/1
20 TABLET, FILM COATED ORAL DAILY
Qty: 90 TABLET | Refills: 1 | Status: SHIPPED | OUTPATIENT
Start: 2025-04-15

## 2025-04-15 RX ORDER — ACETAMINOPHEN 500 MG
5 TABLET ORAL ONCE
Status: COMPLETED | OUTPATIENT
Start: 2025-04-15 | End: 2025-04-15

## 2025-04-15 RX ORDER — PRIMIDONE 50 MG/1
50 TABLET ORAL NIGHTLY
Qty: 90 TABLET | Refills: 1 | Status: SHIPPED | OUTPATIENT
Start: 2025-04-15

## 2025-04-15 RX ORDER — ACETAMINOPHEN 500 MG
10 TABLET ORAL ONCE
Status: COMPLETED | OUTPATIENT
Start: 2025-04-15 | End: 2025-04-15

## 2025-04-15 RX ORDER — ACETAMINOPHEN 500 MG
10 TABLET ORAL ONCE
Status: DISCONTINUED | OUTPATIENT
Start: 2025-04-15 | End: 2025-04-16 | Stop reason: HOSPADM

## 2025-04-15 RX ADMIN — Medication 5 MG: at 04:10

## 2025-04-15 RX ADMIN — ACETAMINOPHEN 650 MG: 325 TABLET ORAL at 23:12

## 2025-04-15 RX ADMIN — POLYETHYLENE GLYCOL 3350 17 G: 17 POWDER, FOR SOLUTION ORAL at 09:01

## 2025-04-15 RX ADMIN — ATORVASTATIN CALCIUM 20 MG: 20 TABLET, FILM COATED ORAL at 09:01

## 2025-04-15 RX ADMIN — CEFTRIAXONE SODIUM 2 G: 2 INJECTION, SOLUTION INTRAVENOUS at 09:01

## 2025-04-15 RX ADMIN — Medication 10 MG: at 20:23

## 2025-04-15 RX ADMIN — ACETAMINOPHEN 650 MG: 325 TABLET ORAL at 04:14

## 2025-04-15 RX ADMIN — PRIMIDONE 50 MG: 50 TABLET ORAL at 20:03

## 2025-04-15 RX ADMIN — Medication 3 L/MIN: at 19:00

## 2025-04-15 RX ADMIN — PANTOPRAZOLE SODIUM 40 MG: 40 TABLET, DELAYED RELEASE ORAL at 05:27

## 2025-04-15 RX ADMIN — AZITHROMYCIN DIHYDRATE 500 MG: 500 TABLET ORAL at 09:01

## 2025-04-15 RX ADMIN — ACETAMINOPHEN 650 MG: 325 TABLET ORAL at 10:21

## 2025-04-15 RX ADMIN — ENOXAPARIN SODIUM 40 MG: 40 INJECTION SUBCUTANEOUS at 18:15

## 2025-04-15 RX ADMIN — Medication 2 L/MIN: at 20:30

## 2025-04-15 RX ADMIN — ACETAMINOPHEN 650 MG: 325 TABLET ORAL at 16:44

## 2025-04-15 ASSESSMENT — COGNITIVE AND FUNCTIONAL STATUS - GENERAL
DAILY ACTIVITIY SCORE: 24
MOBILITY SCORE: 24

## 2025-04-15 ASSESSMENT — PAIN - FUNCTIONAL ASSESSMENT
PAIN_FUNCTIONAL_ASSESSMENT: 0-10

## 2025-04-15 ASSESSMENT — PAIN SCALES - GENERAL
PAINLEVEL_OUTOF10: 1
PAINLEVEL_OUTOF10: 3
PAINLEVEL_OUTOF10: 0 - NO PAIN
PAINLEVEL_OUTOF10: 2
PAINLEVEL_OUTOF10: 0 - NO PAIN
PAINLEVEL_OUTOF10: 0 - NO PAIN
PAINLEVEL_OUTOF10: 3
PAINLEVEL_OUTOF10: 0 - NO PAIN

## 2025-04-15 ASSESSMENT — PAIN DESCRIPTION - LOCATION: LOCATION: HEAD

## 2025-04-15 NOTE — NURSING NOTE
0700: took over care for this pt    Pt remained fever free throughout shift- put needed prn tylenol for headaches.   Pt took off o2 and dropped to 85%- placed back on 3L and has been saturating WNL. Will cont to attempt weaning

## 2025-04-15 NOTE — PROGRESS NOTES
Occupational Therapy                 Therapy Communication Note    Patient Name: Bandar Castellanos  MRN: [id number]  Department: [facility] 3 N  Room: [facility]/3006-A  Today's Date: 4/15/2025     Discipline: Occupational Therapy     Comment:  OT orders received. Chart reviewed.  Per RN, patient independent in room. No skilled therapy needs.  Will sign off.  Please reconsult if status changes.

## 2025-04-15 NOTE — PROGRESS NOTES
Bandar Castellanos is a 69 y.o. male on day 1 of admission presenting with Community acquired pneumonia, unspecified laterality.    Subjective   Overnight the patient was both febrile and hypertensive with some shaking and facial flushing after giving a urine sample. Repeat vitals were obtained with manual /96 but the patient was otherwise afebrile and saturating well on 3 L NC. 5 mg IV labetalol was administered, along with duo-nebs and a CXR was obtained.    Patient seen and evaluated at bedside. Afebrile since 0410, saturating well on 3L NC. He reports that he has not had further episodes of fevers since last documented fevers, and denies any chest pain, shortness of breath on NC, nausea, vomiting, or other acute complaints at this time. He does endorse some left ear pain.    Objective     Last Recorded Vitals  /68   Pulse 77   Temp 36.7 °C (98.1 °F) (Temporal)   Resp 16   Wt 135 kg (297 lb 2.9 oz)   SpO2 95%   Intake/Output last 3 Shifts:    Intake/Output Summary (Last 24 hours) at 4/15/2025 1114  Last data filed at 4/14/2025 2157  Gross per 24 hour   Intake 1300 ml   Output 200 ml   Net 1100 ml       Admission Weight  Weight: 135 kg (297 lb) (04/14/25 1505)    Daily Weight  04/14/25 : 135 kg (297 lb 2.9 oz)    Image Results  XR chest 1 view  Narrative: Interpreted By:  Mynor Velasco,   STUDY:  XR CHEST 1 VIEW;  4/14/2025 10:18 pm      INDICATION:  Signs/Symptoms:Increased work of breathing.          COMPARISON:  04/14/2025      ACCESSION NUMBER(S):  [id number]      ORDERING CLINICIAN:  MORRIS LEVY      FINDINGS:  Slight worsening atelectasis at the left lung base. Small left  pleural effusion noted. The lungs are otherwise clear though the  posterior lung bases are obscured by elevated diaphragm due to  shallow inspiration. The heart is normal in size. No pneumothorax.      Impression: Slight worsening atelectasis at the left lung base with pleural  effusion.          MACRO:  None.       Signed by: Mynor Velasco 4/14/2025 10:53 PM  Dictation workstation:   [id number]  XR chest 1 view  Narrative: Interpreted By:  Celena Heredia,   STUDY:  Chest, single AP view.      INDICATION:  Signs/Symptoms:Chest Pain.      COMPARISON:  CT chest 04/12/2025      ACCESSION NUMBER(S):  [id number]      ORDERING CLINICIAN:  RENNY BLAS      FINDINGS:  The cardiac silhouette size is within normal limits.  There is no focal consolidation, edema or pneumothorax.  Mild atelectatic changes of the left lung base. Questionable small  left basilar pleural effusion. No acute osseous abnormality.      Impression: 1. Mild atelectatic changes of the left lung base. Questionable small  left basilar pleural effusion.  2. No definite focal pulmonary consolidation      MACRO:  None.      Signed by: Celena Heredia 4/14/2025 4:17 PM  Dictation workstation:   [id number]  ECG 12 lead  Normal sinus rhythm  Right bundle branch block  Abnormal ECG  When compared with ECG of 12-APR-2025 17:29, (unconfirmed)  No significant change was found  ECG 12 lead  Normal sinus rhythm  Right bundle branch block  Abnormal ECG  No previous ECGs available    Physical Exam  Constitutional:       General: He is not in acute distress.     Appearance: He is obese. He is not ill-appearing or diaphoretic.   HENT:      Head: Normocephalic and atraumatic.      Ears:      Comments: There is mild erythema of the bilateral ear canal with possible serous effusion behind the left tympanic membrane and mildly erythematous right tympanic membrane. Mastoids nontender to palpation.     Mouth/Throat:      Mouth: Mucous membranes are moist.      Pharynx: No posterior oropharyngeal erythema.   Eyes:      General: No scleral icterus.     Extraocular Movements: Extraocular movements intact.   Cardiovascular:      Rate and Rhythm: Normal rate and regular rhythm.      Pulses: Normal pulses.      Heart sounds: Normal heart sounds.   Pulmonary:      Effort: Pulmonary  effort is normal. No respiratory distress.      Breath sounds: No wheezing.      Comments: Mild crackles at the left lung base, On 3L NC  Abdominal:      General: Abdomen is flat. Bowel sounds are normal. There is no distension.      Palpations: Abdomen is soft.      Tenderness: There is no abdominal tenderness. There is no guarding.      Hernia: No hernia is present.   Musculoskeletal:         General: No swelling or tenderness.   Skin:     General: Skin is warm and dry.   Neurological:      Mental Status: He is alert and oriented to person, place, and time.      Motor: No weakness.   Psychiatric:         Mood and Affect: Mood normal.     Relevant Results  Scheduled medications  atorvastatin, 20 mg, oral, Daily  cefTRIAXone, 2 g, intravenous, q24h  enoxaparin, 40 mg, subcutaneous, q24h  pantoprazole, 40 mg, oral, Daily before breakfast  polyethylene glycol, 17 g, oral, Daily  primidone, 50 mg, oral, Nightly      Continuous medications     PRN medications  PRN medications: acetaminophen, ipratropium-albuteroL, oxygen  Results for orders placed or performed during the hospital encounter of 04/14/25 (from the past 24 hours)   Sars-CoV-2, Influenza A/B and RSV PCR   Result Value Ref Range    Coronavirus 2019, PCR Not Detected Not Detected    Flu A Result Not Detected Not Detected    Flu B Result Not Detected Not Detected    RSV PCR Not Detected Not Detected   CBC with Differential   Result Value Ref Range    WBC 11.1 4.4 - 11.3 x10*3/uL    nRBC 0.0 0.0 - 0.0 /100 WBCs    RBC 4.83 4.50 - 5.90 x10*6/uL    Hemoglobin 14.3 13.5 - 17.5 g/dL    Hematocrit 42.0 41.0 - 52.0 %    MCV 87 80 - 100 fL    MCH 29.6 26.0 - 34.0 pg    MCHC 34.0 32.0 - 36.0 g/dL    RDW 14.5 11.5 - 14.5 %    Platelets 204 150 - 450 x10*3/uL    Neutrophils % 83.3 40.0 - 80.0 %    Immature Granulocytes %, Automated 0.3 0.0 - 0.9 %    Lymphocytes % 6.6 13.0 - 44.0 %    Monocytes % 7.6 2.0 - 10.0 %    Eosinophils % 1.9 0.0 - 6.0 %    Basophils % 0.3 0.0 -  2.0 %    Neutrophils Absolute 9.25 (H) 1.20 - 7.70 x10*3/uL    Immature Granulocytes Absolute, Automated 0.03 0.00 - 0.70 x10*3/uL    Lymphocytes Absolute 0.73 (L) 1.20 - 4.80 x10*3/uL    Monocytes Absolute 0.84 0.10 - 1.00 x10*3/uL    Eosinophils Absolute 0.21 0.00 - 0.70 x10*3/uL    Basophils Absolute 0.03 0.00 - 0.10 x10*3/uL   Comprehensive Metabolic Panel   Result Value Ref Range    Glucose 106 (H) 74 - 99 mg/dL    Sodium 134 (L) 136 - 145 mmol/L    Potassium 4.0 3.5 - 5.3 mmol/L    Chloride 100 98 - 107 mmol/L    Bicarbonate 22 21 - 32 mmol/L    Anion Gap 16 10 - 20 mmol/L    Urea Nitrogen 15 6 - 23 mg/dL    Creatinine 0.85 0.50 - 1.30 mg/dL    eGFR >90 >60 mL/min/1.73m*2    Calcium 9.2 8.6 - 10.3 mg/dL    Albumin 3.9 3.4 - 5.0 g/dL    Alkaline Phosphatase 82 33 - 136 U/L    Total Protein 7.4 6.4 - 8.2 g/dL    AST 24 9 - 39 U/L    Bilirubin, Total 0.9 0.0 - 1.2 mg/dL    ALT 29 10 - 52 U/L   Brain Natriuretic Peptide   Result Value Ref Range    BNP 52 0 - 99 pg/mL   Troponin I, High Sensitivity, Initial   Result Value Ref Range    Troponin I, High Sensitivity 8 0 - 20 ng/L   Lactate   Result Value Ref Range    Lactate 0.8 0.4 - 2.0 mmol/L   Light Blue Top   Result Value Ref Range    Extra Tube Hold for add-ons.    Blood Culture    Specimen: Peripheral Venipuncture; Blood culture   Result Value Ref Range    Blood Culture Loaded on Instrument - Culture in progress    Troponin, High Sensitivity, 1 Hour   Result Value Ref Range    Troponin I, High Sensitivity 10 0 - 20 ng/L   Blood Culture    Specimen: Peripheral Venipuncture; Blood culture   Result Value Ref Range    Blood Culture Loaded on Instrument - Culture in progress    Urinalysis with Reflex Culture and Microscopic   Result Value Ref Range    Color, Urine Yellow Light-Yellow, Yellow, Dark-Yellow    Appearance, Urine Clear Clear    Specific Gravity, Urine 1.033 1.005 - 1.035    pH, Urine 5.5 5.0, 5.5, 6.0, 6.5, 7.0, 7.5, 8.0    Protein, Urine 50 (1+) (A)  NEGATIVE, 10 (TRACE), 20 (TRACE) mg/dL    Glucose, Urine Normal Normal mg/dL    Blood, Urine NEGATIVE NEGATIVE mg/dL    Ketones, Urine TRACE (A) NEGATIVE mg/dL    Bilirubin, Urine NEGATIVE NEGATIVE mg/dL    Urobilinogen, Urine 2 (1+) (A) Normal mg/dL    Nitrite, Urine NEGATIVE NEGATIVE    Leukocyte Esterase, Urine NEGATIVE NEGATIVE   Extra Urine Gray Tube   Result Value Ref Range    Extra Tube Hold for add-ons.    Streptococcus pneumoniae Antigen, Urine    Specimen: Clean Catch/Voided; Urine   Result Value Ref Range    Streptococcus pneumoniae Ag, Urine Negative Negative   Legionella Antigen, Urine    Specimen: Clean Catch/Voided; Urine   Result Value Ref Range    L. pneumophila Urine Ag Negative Negative   Procalcitonin   Result Value Ref Range    Procalcitonin 0.17 (H) <=0.07 ng/mL   Urinalysis Microscopic   Result Value Ref Range    WBC, Urine 1-5 1-5, NONE /HPF    RBC, Urine 1-2 NONE, 1-2, 3-5 /HPF    Mucus, Urine FEW Reference range not established. /LPF   CBC   Result Value Ref Range    WBC 8.5 4.4 - 11.3 x10*3/uL    nRBC 0.0 0.0 - 0.0 /100 WBCs    RBC 4.13 (L) 4.50 - 5.90 x10*6/uL    Hemoglobin 12.2 (L) 13.5 - 17.5 g/dL    Hematocrit 36.9 (L) 41.0 - 52.0 %    MCV 89 80 - 100 fL    MCH 29.5 26.0 - 34.0 pg    MCHC 33.1 32.0 - 36.0 g/dL    RDW 14.5 11.5 - 14.5 %    Platelets 191 150 - 450 x10*3/uL   Magnesium   Result Value Ref Range    Magnesium 2.09 1.60 - 2.40 mg/dL   Renal Function Panel   Result Value Ref Range    Glucose 108 (H) 74 - 99 mg/dL    Sodium 135 (L) 136 - 145 mmol/L    Potassium 3.7 3.5 - 5.3 mmol/L    Chloride 101 98 - 107 mmol/L    Bicarbonate 23 21 - 32 mmol/L    Anion Gap 15 10 - 20 mmol/L    Urea Nitrogen 14 6 - 23 mg/dL    Creatinine 0.88 0.50 - 1.30 mg/dL    eGFR >90 >60 mL/min/1.73m*2    Calcium 8.5 (L) 8.6 - 10.3 mg/dL    Phosphorus 2.9 2.5 - 4.9 mg/dL    Albumin 3.3 (L) 3.4 - 5.0 g/dL   SST TOP   Result Value Ref Range    Extra Tube Hold for add-ons.      XR chest 1 view    Result  Date: 4/14/2025  Interpreted By:  Mynor Velasco, STUDY: XR CHEST 1 VIEW;  4/14/2025 10:18 pm   INDICATION: Signs/Symptoms:Increased work of breathing.     COMPARISON: 04/14/2025   ACCESSION NUMBER(S): [id number]   ORDERING CLINICIAN: MORRIS LEVY   FINDINGS: Slight worsening atelectasis at the left lung base. Small left pleural effusion noted. The lungs are otherwise clear though the posterior lung bases are obscured by elevated diaphragm due to shallow inspiration. The heart is normal in size. No pneumothorax.       Slight worsening atelectasis at the left lung base with pleural effusion.     MACRO: None.   Signed by: Mynor Velasco 4/14/2025 10:53 PM Dictation workstation:   [id number]    XR chest 1 view    Result Date: 4/14/2025  Interpreted By:  Celena Heredia, STUDY: Chest, single AP view.   INDICATION: Signs/Symptoms:Chest Pain.   COMPARISON: CT chest 04/12/2025   ACCESSION NUMBER(S): [id number]   ORDERING CLINICIAN: RENNY BLAS   FINDINGS: The cardiac silhouette size is within normal limits. There is no focal consolidation, edema or pneumothorax. Mild atelectatic changes of the left lung base. Questionable small left basilar pleural effusion. No acute osseous abnormality.       1. Mild atelectatic changes of the left lung base. Questionable small left basilar pleural effusion. 2. No definite focal pulmonary consolidation   MACRO: None.   Signed by: Cleena Heredia 4/14/2025 4:17 PM Dictation workstation:   [id number]       Assessment/Plan   Assessment & Plan      # AHRF 2/2 ? CAP  # Fevers 2/2 above vs. Otitis externa  Unclear origin of fever, labs demonstrating no leukocytosis, minimal procal, negative UA, negative flu/COVID/RSV PCR, along with prior CT without obvious of consolidation and negative duplex do not suggest bacterial pneumonia, UTI, or thromboembolic event as etiology of fevers.  Physical exam findings do not support mastoiditis.  - Prior CTA chest 4/12 demonstrated dilated  main pulmonary artery 3.5 cm and pulmonary nodules up to 7 mm, but no focal consolidation  - CXR 4/15 x2 demonstrating atelectasis with pleural effusion but no definite consolidation  - Legionella and strep urine antigen ngeative  - Procal 0.17  - Sputum culture, viral respiratory panel, blood cultures pending  - Will tentatively continue Rocephin (4/14 - ) to cover for PNA and otitis  - Tylenol q6h prn for fevers  - Saturating well on 3 L NC, continue supplemental O2 as needed to maintain SpO2 > 92%, wean as tolerated  - Incentive spirometry  - PT/OT     Chronic Conditions:  # HTN  # HLD - atorvastatin  # GERD - pantoprazole  # Essential tremors - primidone  - Continue home medications and management as appropriate    IVF: As needed  Diet: Regular  ABx: Ceftriaxone  Consults: PT/OT  DVT: Lovenox    Dispo: Will monitor fever curve, anticipate discharge in the next 24-48 hours pending continued clinical stability.    The assessment and plan were discussed with the attending physician,  Christian Lemons, DO  Internal Medicine PGY-2

## 2025-04-15 NOTE — CARE PLAN
Problem: Pain - Adult  Goal: Verbalizes/displays adequate comfort level or baseline comfort level  Outcome: Progressing     Problem: Safety - Adult  Goal: Free from fall injury  Outcome: Progressing     Problem: Chronic Conditions and Co-morbidities  Goal: Patient's chronic conditions and co-morbidity symptoms are monitored and maintained or improved  Outcome: Progressing     Problem: Nutrition  Goal: Nutrient intake appropriate for maintaining nutritional needs  Outcome: Progressing    The patient's goals for the shift include  feel better    The clinical goals for the shift include remain hemodynamically stable    Patient having ok shift overall. Patient Aox4, 3L NC, independent in the room. Patient consistently spiking fevers, responding to tylenol but febrile again after about 4-6 hours of tylenol administration. Patient worked up for PNA, IV rocephin and PO zithro ordered. Patient had providers at bedside once this shift due to shaking, redness, fever, HTN, and increased work of breathing. Cxray ordered, breathing treatments ordered, tylenol and IV labetalol administered. Patient feels generally poor, did not sleep well, and has minimal energy. Viral panel collected, pending results. Patient safety maintained.

## 2025-04-15 NOTE — CARE PLAN
Problem: Pain - Adult  Goal: Verbalizes/displays adequate comfort level or baseline comfort level  Outcome: Progressing     Problem: Safety - Adult  Goal: Free from fall injury  Outcome: Progressing     Problem: Discharge Planning  Goal: Discharge to home or other facility with appropriate resources  Outcome: Progressing     Problem: Chronic Conditions and Co-morbidities  Goal: Patient's chronic conditions and co-morbidity symptoms are monitored and maintained or improved  Outcome: Progressing     Problem: Nutrition  Goal: Nutrient intake appropriate for maintaining nutritional needs  Outcome: Progressing     Problem: Respiratory  Goal: Clear secretions with interventions this shift  Outcome: Progressing  Goal: Minimize anxiety/maximize coping throughout shift  Outcome: Progressing  Goal: Minimal/no exertional discomfort or dyspnea this shift  Outcome: Progressing  Goal: No signs of respiratory distress (eg. Use of accessory muscles. Peds grunting)  Outcome: Progressing  Goal: Patent airway maintained this shift  Outcome: Progressing  Goal: Tolerate mechanical ventilation evidenced by VS/agitation level this shift  Outcome: Progressing  Goal: Tolerate pulmonary toileting this shift  Outcome: Progressing  Goal: Verbalize decreased shortness of breath this shift  Outcome: Progressing  Goal: Wean oxygen to maintain O2 saturation per order/standard this shift  Outcome: Progressing  Goal: Increase self care and/or family involvement in next 24 hours  Outcome: Progressing     Problem: Pain  Goal: Takes deep breaths with improved pain control throughout the shift  Outcome: Progressing  Goal: Turns in bed with improved pain control throughout the shift  Outcome: Progressing  Goal: Walks with improved pain control throughout the shift  Outcome: Progressing  Goal: Performs ADL's with improved pain control throughout shift  Outcome: Progressing  Goal: Participates in PT with improved pain control throughout the  shift  Outcome: Progressing  Goal: Free from opioid side effects throughout the shift  Outcome: Progressing  Goal: Free from acute confusion related to pain meds throughout the shift  Outcome: Progressing

## 2025-04-15 NOTE — SIGNIFICANT EVENT
Notified by RN at bedside at 2100 that patient is hypertensive at 214/81, tachypneic with RR of 36, wheezing, diaphoretic, shaking, and facial flushing.  Evaluated patient at bedside along with senior resident Dr. Bourne.  Patient reports he became shaky and started having the chills after giving an urine sample with his gown open in the back. Diaphoresis and chills are similar to those he had at home. He endorses having some headache without any vision changes or blurry vision. He denies having chest pain or palpitations. Vitals obtained at bedside. He's afebrile with temp of 36.8C, manual /96, , and saturating at 95% on 3L NC. On physical exam, patient has increased work of breathing but no wheezing on auscultation. Not requiring additional supplemental oxygen. Will obtain STAT CXR to evaluate for flash pulmonary edema, give Labetalol 5mg IV for symptomatic hypertension, and Duo-neb breathing treatment for wheezing.     Chrissie Jasmine, DO  Internal Medicine PGY1

## 2025-04-15 NOTE — PROGRESS NOTES
INTERNAL MEDICINE PROGRESS NOTE  TEACHING SERVICE - *** TEAM    Subjective   Bandar Castellanos is a 69 y.o. male admitted on 4/14/2025  3:40 PM with chief complaint of dyspnea and fever.     HPI:  Patient seen and examined this morning at bedside. Vital are stable this morning. Overnight patient was hypertensive with a BP of 214/81, tachypneic, diaphoretic and shaking, was given IV Labetalol 5mg and Duoneb, chest x-ray obtained show pleural effusion.  Patient is currently doing well with no acute concerns. Patient admits to headache. Patient denies any vision changes, chest pain, palpitations, cough, abdominal pain. Currently on 3L nasal canula.         Objective   Vitals:  Most Recent:  Vitals:    04/15/25 0800   BP: 128/68   Pulse: 77   Resp: 16   Temp: 36.7 °C (98.1 °F)   SpO2: 95%       24hr Min/Max:  Temp  Min: 36.7 °C (98.1 °F)  Max: 39.6 °C (103.3 °F)  Pulse  Min: 77  Max: 111  BP  Min: 106/53  Max: 214/81  Resp  Min: 16  Max: 34  SpO2  Min: 90 %  Max: 97 %    Intake/Output:    Intake/Output Summary (Last 24 hours) at 4/15/2025 1021  Last data filed at 4/14/2025 2157  Gross per 24 hour   Intake 1300 ml   Output 200 ml   Net 1100 ml       Physical exam:    General: Well appearing male  Heart: Regular rate & rhythm  Lungs: Clear to auscultation bilaterally  Abdomen: Soft, non-distended, normoactive bowel movements  Extremities: No edema or lesions    Lab/Radiology/Diagnostic Review:  Results for orders placed or performed during the hospital encounter of 04/14/25 (from the past 24 hours)   Sars-CoV-2, Influenza A/B and RSV PCR   Result Value Ref Range    Coronavirus 2019, PCR Not Detected Not Detected    Flu A Result Not Detected Not Detected    Flu B Result Not Detected Not Detected    RSV PCR Not Detected Not Detected   CBC with Differential   Result Value Ref Range    WBC 11.1 4.4 - 11.3 x10*3/uL    nRBC 0.0 0.0 - 0.0 /100 WBCs    RBC 4.83 4.50 - 5.90 x10*6/uL    Hemoglobin 14.3 13.5 - 17.5 g/dL     Hematocrit 42.0 41.0 - 52.0 %    MCV 87 80 - 100 fL    MCH 29.6 26.0 - 34.0 pg    MCHC 34.0 32.0 - 36.0 g/dL    RDW 14.5 11.5 - 14.5 %    Platelets 204 150 - 450 x10*3/uL    Neutrophils % 83.3 40.0 - 80.0 %    Immature Granulocytes %, Automated 0.3 0.0 - 0.9 %    Lymphocytes % 6.6 13.0 - 44.0 %    Monocytes % 7.6 2.0 - 10.0 %    Eosinophils % 1.9 0.0 - 6.0 %    Basophils % 0.3 0.0 - 2.0 %    Neutrophils Absolute 9.25 (H) 1.20 - 7.70 x10*3/uL    Immature Granulocytes Absolute, Automated 0.03 0.00 - 0.70 x10*3/uL    Lymphocytes Absolute 0.73 (L) 1.20 - 4.80 x10*3/uL    Monocytes Absolute 0.84 0.10 - 1.00 x10*3/uL    Eosinophils Absolute 0.21 0.00 - 0.70 x10*3/uL    Basophils Absolute 0.03 0.00 - 0.10 x10*3/uL   Comprehensive Metabolic Panel   Result Value Ref Range    Glucose 106 (H) 74 - 99 mg/dL    Sodium 134 (L) 136 - 145 mmol/L    Potassium 4.0 3.5 - 5.3 mmol/L    Chloride 100 98 - 107 mmol/L    Bicarbonate 22 21 - 32 mmol/L    Anion Gap 16 10 - 20 mmol/L    Urea Nitrogen 15 6 - 23 mg/dL    Creatinine 0.85 0.50 - 1.30 mg/dL    eGFR >90 >60 mL/min/1.73m*2    Calcium 9.2 8.6 - 10.3 mg/dL    Albumin 3.9 3.4 - 5.0 g/dL    Alkaline Phosphatase 82 33 - 136 U/L    Total Protein 7.4 6.4 - 8.2 g/dL    AST 24 9 - 39 U/L    Bilirubin, Total 0.9 0.0 - 1.2 mg/dL    ALT 29 10 - 52 U/L   Brain Natriuretic Peptide   Result Value Ref Range    BNP 52 0 - 99 pg/mL   Troponin I, High Sensitivity, Initial   Result Value Ref Range    Troponin I, High Sensitivity 8 0 - 20 ng/L   Lactate   Result Value Ref Range    Lactate 0.8 0.4 - 2.0 mmol/L   Light Blue Top   Result Value Ref Range    Extra Tube Hold for add-ons.    Blood Culture    Specimen: Peripheral Venipuncture; Blood culture   Result Value Ref Range    Blood Culture Loaded on Instrument - Culture in progress    Troponin, High Sensitivity, 1 Hour   Result Value Ref Range    Troponin I, High Sensitivity 10 0 - 20 ng/L   Blood Culture    Specimen: Peripheral Venipuncture; Blood  culture   Result Value Ref Range    Blood Culture Loaded on Instrument - Culture in progress    Urinalysis with Reflex Culture and Microscopic   Result Value Ref Range    Color, Urine Yellow Light-Yellow, Yellow, Dark-Yellow    Appearance, Urine Clear Clear    Specific Gravity, Urine 1.033 1.005 - 1.035    pH, Urine 5.5 5.0, 5.5, 6.0, 6.5, 7.0, 7.5, 8.0    Protein, Urine 50 (1+) (A) NEGATIVE, 10 (TRACE), 20 (TRACE) mg/dL    Glucose, Urine Normal Normal mg/dL    Blood, Urine NEGATIVE NEGATIVE mg/dL    Ketones, Urine TRACE (A) NEGATIVE mg/dL    Bilirubin, Urine NEGATIVE NEGATIVE mg/dL    Urobilinogen, Urine 2 (1+) (A) Normal mg/dL    Nitrite, Urine NEGATIVE NEGATIVE    Leukocyte Esterase, Urine NEGATIVE NEGATIVE   Extra Urine Gray Tube   Result Value Ref Range    Extra Tube Hold for add-ons.    Streptococcus pneumoniae Antigen, Urine    Specimen: Clean Catch/Voided; Urine   Result Value Ref Range    Streptococcus pneumoniae Ag, Urine Negative Negative   Legionella Antigen, Urine    Specimen: Clean Catch/Voided; Urine   Result Value Ref Range    L. pneumophila Urine Ag Negative Negative   Procalcitonin   Result Value Ref Range    Procalcitonin 0.17 (H) <=0.07 ng/mL   Urinalysis Microscopic   Result Value Ref Range    WBC, Urine 1-5 1-5, NONE /HPF    RBC, Urine 1-2 NONE, 1-2, 3-5 /HPF    Mucus, Urine FEW Reference range not established. /LPF   CBC   Result Value Ref Range    WBC 8.5 4.4 - 11.3 x10*3/uL    nRBC 0.0 0.0 - 0.0 /100 WBCs    RBC 4.13 (L) 4.50 - 5.90 x10*6/uL    Hemoglobin 12.2 (L) 13.5 - 17.5 g/dL    Hematocrit 36.9 (L) 41.0 - 52.0 %    MCV 89 80 - 100 fL    MCH 29.5 26.0 - 34.0 pg    MCHC 33.1 32.0 - 36.0 g/dL    RDW 14.5 11.5 - 14.5 %    Platelets 191 150 - 450 x10*3/uL   Magnesium   Result Value Ref Range    Magnesium 2.09 1.60 - 2.40 mg/dL   Renal Function Panel   Result Value Ref Range    Glucose 108 (H) 74 - 99 mg/dL    Sodium 135 (L) 136 - 145 mmol/L    Potassium 3.7 3.5 - 5.3 mmol/L    Chloride 101  98 - 107 mmol/L    Bicarbonate 23 21 - 32 mmol/L    Anion Gap 15 10 - 20 mmol/L    Urea Nitrogen 14 6 - 23 mg/dL    Creatinine 0.88 0.50 - 1.30 mg/dL    eGFR >90 >60 mL/min/1.73m*2    Calcium 8.5 (L) 8.6 - 10.3 mg/dL    Phosphorus 2.9 2.5 - 4.9 mg/dL    Albumin 3.3 (L) 3.4 - 5.0 g/dL   SST TOP   Result Value Ref Range    Extra Tube Hold for add-ons.      Imaging  XR chest 1 view    Result Date: 4/14/2025  Slight worsening atelectasis at the left lung base with pleural effusion.     MACRO: None.   Signed by: Mynor Velasco 4/14/2025 10:53 PM Dictation workstation:   [id number]    XR chest 1 view    Result Date: 4/14/2025  1. Mild atelectatic changes of the left lung base. Questionable small left basilar pleural effusion. 2. No definite focal pulmonary consolidation   MACRO: None.   Signed by: Celena Heredia 4/14/2025 4:17 PM Dictation workstation:   [id number]    CT angio chest for pulmonary embolism    Result Date: 4/12/2025  1. Mildly limited study due to suboptimal bolus timing. No discrete filling defects within the main pulmonary artery or its branches to proximal segmental level. Please note that, assessment of distal segmental and subsegmental branches is limited and small peripheral emboli are not entirely excluded 2. Dilated main pulmonary artery measuring 3.5 cm which can be associated with pulmonary hypertension. 3. Pulmonary nodules measuring up to 7 mm as described above. Recommend follow-up CT chest in 6 months to document stability. Incidental Finding:  A solid non-calcified pulmonary nodule measuring 6-8 mm .   Instructions:  Recommend follow up non contrast chest CT at 6-12 months, then consider CT chest at 18-24 months. (Kody Francois et al., Guidelines for management of incidental pulmonary nodules detected on CT images: From the Fleischner Society 2017, Radiology. 2017 Jul;284 (1):228-243.) FLEISCHNER.ACR.IF.2 4. Enlarged mediastinal lymph nodes as described above that are likely reactive.  Attention on follow-up recommended. 5. Few small ground-glass pulmonary nodules are visualized in the inferior aspect of the left upper and left lower lobes likely representing mild aspiration or infectious bronchiolitis.   MACRO: Critical Finding:  See findings. Notification was initiated on 4/12/2025 at 8:09 pm by  Celena Heredia.  (**-YCF-**)   Signed by: Celena Heredia 4/12/2025 8:10 PM Dictation workstation:   [id number]    XR chest 1 view    Result Date: 4/12/2025  1. Right infrahilar fullness. Although this may represent the bronchovascular structures other etiologies are not excluded and chest CT is recommended to further assess. 2. Linear left basilar opacities likely related to atelectasis.   MACRO: None   Signed by: Luc Roca 4/12/2025 6:37 PM Dictation workstation:   [id number]     Cardiology, Vascular, and Other Imaging  ECG 12 lead    Result Date: 4/14/2025  Normal sinus rhythm Right bundle branch block Abnormal ECG When compared with ECG of 12-APR-2025 17:29, (unconfirmed) No significant change was found    ECG 12 lead    Result Date: 4/14/2025  Normal sinus rhythm Right bundle branch block Abnormal ECG No previous ECGs available        Assessment /Plan    Assessment & Plan      This is a 70 yo male PMHx of HTN, HLD, GERD and Essential tremor who was admitted for Acute hypoxic respiratory failure.       # Fever of Unknown Origin  -Discontinue Azithromycin and Rocephin since infection is most likely viral in nature.  -Perform Procalcitonin, Legionella, Respiratory panel and strep testing  -Trend temperatures q4 hours  -Continue to monitor Hgb  -Wean down to 2L nasal canula with goal to completely wean off oxygen requirement.      Consultants: None  Diet: Full diet  mIVF: None  Code Status: Full Code  DVT Prophylaxis: Doppler was negative  Disposition: Can be discharged home tomorrow if fever has resolved, follow up with PCP.    This assessment and plan has been discussed with the resident  physician Dr. Christian Lemons.    TRAMAINE MONZON  MS3  Medical Student    This note may have been transcribed using Dragon voice recognition system and there is a possibility of unintentional typing misprints.  Any information found to be copied from previous providers is done in the best interest of the patient to provide accurate, quality, and continuity of care.

## 2025-04-15 NOTE — PROGRESS NOTES
04/15/25 1142   Discharge Planning   Living Arrangements Spouse/significant other   Support Systems Spouse/significant other   Assistance Needed None   Type of Residence Private residence   Number of Stairs to Enter Residence 3   Number of Stairs Within Residence   (Pt states ranch style home)   Home or Post Acute Services None   Expected Discharge Disposition Home   Does the patient need discharge transport arranged? No   Financial Resource Strain   How hard is it for you to pay for the very basics like food, housing, medical care, and heating? Not very   Stroke Family Assessment   Stroke Family Assessment Needed No   Intensity of Service   Intensity of Service 0-30 min     Met with pt at bedside to review his dc plan. Lives at home with his wife in a ranch style home. Reports no difficulty with steps leading into the home. Drives and is independent at home. Does not use oxygen at home. PCP is Dr. Mckenna and prescriptions are filled at [facility] with no barriers noted. Denies difficulty with his living expenses such as utilities/groceries/prescriptions. Therapy eval completed, no needs found. Pt has a ride home at time of dc.

## 2025-04-15 NOTE — CARE PLAN
Problem: Pain - Adult  Goal: Verbalizes/displays adequate comfort level or baseline comfort level  Outcome: Progressing     Problem: Safety - Adult  Goal: Free from fall injury  Outcome: Progressing     Problem: Chronic Conditions and Co-morbidities  Goal: Patient's chronic conditions and co-morbidity symptoms are monitored and maintained or improved  Outcome: Progressing     Problem: Pain  Goal: Turns in bed with improved pain control throughout the shift  Outcome: Progressing    The patient's goals for the shift include  get better sleep tonight    The clinical goals for the shift include patient to maintain HDS and remain afebrile throughout end of shift    Patient having better shift overall compared to last night. Patient receiving tylenol round the clock for headache which could be masking potential fever. Patient VS much more normal today. Patient stating he still feels poor, does appear to be worn out. Patient endorses dyspnea on exertion and with conversation, audible wheezing heard with exertion. Patient medicated with melatonin to help him sleep tonight. Patient remains in droplet and contact precautions, awaiting viral panel to result. Patient Aox4, 2L NC, independent in the room. Safety maintained.

## 2025-04-15 NOTE — PROGRESS NOTES
Physical Therapy                 Therapy Communication Note    Patient Name: Bandar Castellanos  MRN: [id number]  Department: [facility] 3 N  Room: [facility]-A  Today's Date: 4/15/2025     Discipline: Physical Therapy    Comment: Orders received and chart review completed. Per nursing staff pt ambulating independently in room. Pt independent with mobility, ADLs and IADLs. Pt with no concerns at this time about returning home. Will discharge PT orders.

## 2025-04-15 NOTE — ASSESSMENT & PLAN NOTE
I suspect the patient may have community-acquired pneumonia with some hypoxia and poor intake.  His clinical condition is worsening even on oral antibiotics.  I discussed this with him and his wife and I recommend that they take him immediately to the emergency room at [facility] for further evaluation and possible admission.  They understand and agree.  His wife is going to drive and he is hemodynamically stable.  I want him to follow-up with me in about 4 to 5 days if he is hospitalized or if he is not hospitalized.    I called the emergency room at [facility] and spoke with one of the physicians and gave them a clinical update and reviewed my concerns.

## 2025-04-16 ENCOUNTER — PHARMACY VISIT (OUTPATIENT)
Dept: PHARMACY | Facility: CLINIC | Age: 70
End: 2025-04-16
Payer: MEDICARE

## 2025-04-16 VITALS
BODY MASS INDEX: 34.38 KG/M2 | RESPIRATION RATE: 20 BRPM | OXYGEN SATURATION: 97 % | SYSTOLIC BLOOD PRESSURE: 131 MMHG | TEMPERATURE: 96.4 F | WEIGHT: 297.18 LBS | HEIGHT: 78 IN | HEART RATE: 82 BPM | DIASTOLIC BLOOD PRESSURE: 77 MMHG

## 2025-04-16 PROBLEM — J18.9 COMMUNITY ACQUIRED PNEUMONIA, UNSPECIFIED LATERALITY: Status: RESOLVED | Noted: 2025-04-14 | Resolved: 2025-04-16

## 2025-04-16 LAB
ADENOVIRUS RVP, VIRC: NOT DETECTED
ALBUMIN SERPL BCP-MCNC: 3.6 G/DL (ref 3.4–5)
ANION GAP SERPL CALC-SCNC: 13 MMOL/L (ref 10–20)
ATRIAL RATE: 107 BPM
ATRIAL RATE: 88 BPM
BUN SERPL-MCNC: 12 MG/DL (ref 6–23)
CALCIUM SERPL-MCNC: 8.9 MG/DL (ref 8.6–10.3)
CHLORIDE SERPL-SCNC: 100 MMOL/L (ref 98–107)
CO2 SERPL-SCNC: 28 MMOL/L (ref 21–32)
CREAT SERPL-MCNC: 0.84 MG/DL (ref 0.5–1.3)
EGFRCR SERPLBLD CKD-EPI 2021: >90 ML/MIN/1.73M*2
ENTEROVIRUS/RHINOVIRUS RVP, VIRC: NOT DETECTED
ERYTHROCYTE [DISTWIDTH] IN BLOOD BY AUTOMATED COUNT: 14.4 % (ref 11.5–14.5)
GLUCOSE SERPL-MCNC: 99 MG/DL (ref 74–99)
HCT VFR BLD AUTO: 39.3 % (ref 41–52)
HGB BLD-MCNC: 13 G/DL (ref 13.5–17.5)
HUMAN BOCAVIRUS RVP, VIRC: NOT DETECTED
HUMAN CORONAVIRUS RVP, VIRC: NOT DETECTED
INFLUENZA A , VIRC: NOT DETECTED
INFLUENZA A H1N1-09 , VIRC: NOT DETECTED
INFLUENZA B PCR, VIRC: NOT DETECTED
MCH RBC QN AUTO: 29.3 PG (ref 26–34)
MCHC RBC AUTO-ENTMCNC: 33.1 G/DL (ref 32–36)
MCV RBC AUTO: 89 FL (ref 80–100)
METAPNEUMOVIRUS , VIRC: NOT DETECTED
NRBC BLD-RTO: 0 /100 WBCS (ref 0–0)
P AXIS: 6 DEGREES
P AXIS: 63 DEGREES
P OFFSET: 163 MS
P OFFSET: 166 MS
P ONSET: 102 MS
P ONSET: 112 MS
PARAINFLUENZA PCR, VIRC: NOT DETECTED
PHOSPHATE SERPL-MCNC: 3.7 MG/DL (ref 2.5–4.9)
PLATELET # BLD AUTO: 210 X10*3/UL (ref 150–450)
POTASSIUM SERPL-SCNC: 3.8 MMOL/L (ref 3.5–5.3)
PR INTERVAL: 162 MS
PR INTERVAL: 176 MS
Q ONSET: 190 MS
Q ONSET: 193 MS
QRS COUNT: 14 BEATS
QRS COUNT: 18 BEATS
QRS DURATION: 142 MS
QRS DURATION: 148 MS
QT INTERVAL: 346 MS
QT INTERVAL: 368 MS
QTC CALCULATION(BAZETT): 445 MS
QTC CALCULATION(BAZETT): 461 MS
QTC FREDERICIA: 418 MS
QTC FREDERICIA: 419 MS
R AXIS: 53 DEGREES
R AXIS: 77 DEGREES
RBC # BLD AUTO: 4.43 X10*6/UL (ref 4.5–5.9)
RSV PCR, RVP, VIRC: NOT DETECTED
SODIUM SERPL-SCNC: 137 MMOL/L (ref 136–145)
T AXIS: -20 DEGREES
T AXIS: 48 DEGREES
T OFFSET: 366 MS
T OFFSET: 374 MS
VENTRICULAR RATE: 107 BPM
VENTRICULAR RATE: 88 BPM
WBC # BLD AUTO: 7 X10*3/UL (ref 4.4–11.3)

## 2025-04-16 PROCEDURE — 2500000004 HC RX 250 GENERAL PHARMACY W/ HCPCS (ALT 636 FOR OP/ED): Mod: JZ

## 2025-04-16 PROCEDURE — 2500000001 HC RX 250 WO HCPCS SELF ADMINISTERED DRUGS (ALT 637 FOR MEDICARE OP)

## 2025-04-16 PROCEDURE — 36415 COLL VENOUS BLD VENIPUNCTURE: CPT

## 2025-04-16 PROCEDURE — 85027 COMPLETE CBC AUTOMATED: CPT

## 2025-04-16 PROCEDURE — 99238 HOSP IP/OBS DSCHRG MGMT 30/<: CPT

## 2025-04-16 PROCEDURE — 94760 N-INVAS EAR/PLS OXIMETRY 1: CPT

## 2025-04-16 PROCEDURE — RXMED WILLOW AMBULATORY MEDICATION CHARGE

## 2025-04-16 PROCEDURE — 80069 RENAL FUNCTION PANEL: CPT

## 2025-04-16 RX ORDER — DOXYCYCLINE 100 MG/1
100 CAPSULE ORAL 2 TIMES DAILY
Qty: 6 CAPSULE | Refills: 0 | Status: SHIPPED | OUTPATIENT
Start: 2025-04-17 | End: 2025-04-20

## 2025-04-16 RX ADMIN — ACETAMINOPHEN 650 MG: 325 TABLET ORAL at 11:28

## 2025-04-16 RX ADMIN — CEFTRIAXONE SODIUM 2 G: 2 INJECTION, SOLUTION INTRAVENOUS at 09:45

## 2025-04-16 RX ADMIN — PANTOPRAZOLE SODIUM 40 MG: 40 TABLET, DELAYED RELEASE ORAL at 05:20

## 2025-04-16 RX ADMIN — ATORVASTATIN CALCIUM 20 MG: 20 TABLET, FILM COATED ORAL at 09:45

## 2025-04-16 ASSESSMENT — PAIN - FUNCTIONAL ASSESSMENT: PAIN_FUNCTIONAL_ASSESSMENT: 0-10

## 2025-04-16 ASSESSMENT — PAIN DESCRIPTION - LOCATION: LOCATION: HEAD

## 2025-04-16 ASSESSMENT — PAIN SCALES - GENERAL
PAINLEVEL_OUTOF10: 3
PAINLEVEL_OUTOF10: 0 - NO PAIN

## 2025-04-16 NOTE — DISCHARGE INSTRUCTIONS
Please follow-up with your primary care provider within 7 days for hospital follow-up and for possible evaluation of sleep apnea.  Please call to make this appointment if it has not already been made.    Medication changes:  Start taking doxycyline (Vibramycin) 100 mg capsule, take 1 capsule (100 mg) by mouth 2 times a day for 3 days to complete a 5 day course of antibiotics.  Stop taking your Augmentin.    Otherwise, please continue taking your medication as prescribed    You have been prescribed home oxygen at this time due to hypoxia, please continue following with your primary care team regarding this.    If you have any new or worsening symptoms, seek medical attention.    Thank you for allowing us to participate in your medical care!    -[facility] inpatient medicine teaching service

## 2025-04-16 NOTE — NURSING NOTE
ADOD: 4/16.   Destination: home  Transportation Provided by: Family Member  Patient feels updated by provider(s) and involved in POC.   Patient has been advised to defer to AVS for new medications and follow-up visits.   Patient is inactive with MyChart.  Patient's Pharmacy M2B.  Appointments will be made by patient.  Patient has been notified about a survey and call back is to be expected 1-2 weeks post discharge.   Notified patient that DC Navigator is available everyday throughout their stay if any assistance is needed.     [place] [address] 4L NC provided by [facility]  M2B to deliver new prescriptions   Patients wife to transport home

## 2025-04-16 NOTE — DISCHARGE SUMMARY
Discharge Diagnosis  Acute hypoxic respiratory failure 2/2 viral URI  Otitis Externa    Issues Requiring Follow-Up  AHRF requiring oxygen    Patient will need to follow up with primary care for hospital follow up, home oxygen continuation as needed, and for sleep study.    Doxycyline prescribed.    Discharge Meds     Medication List      START taking these medications     doxycycline 100 mg capsule; Commonly known as: Vibramycin; Take 1   capsule (100 mg) by mouth 2 times a day for 3 days. Take with at least 8   ounces (large glass) of water, do not lie down for 30 minutes after Do not   start before April 17, 2025.; Start taking on: April 17, 2025     CONTINUE taking these medications     atorvastatin 20 mg tablet; Commonly known as: Lipitor; Take 1 tablet (20   mg) by mouth once daily.   Fish OiL 1,000 (120-180) mg capsule; Generic drug: omega 3-dha-epa-fish   oil   magnesium 250 mg tablet   multivitamin capsule   omeprazole 40 mg DR capsule; Commonly known as: PriLOSEC; Take 1 capsule   (40 mg) by mouth once daily in the morning. Take before meals. Do not   crush or chew.   primidone 50 mg tablet; Commonly known as: Mysoline; Take 1 tablet (50   mg) by mouth once daily at bedtime.     STOP taking these medications     amoxicillin-clavulanate 875-125 mg tablet; Commonly known as: Augmentin     Test Results Pending At Discharge  Pending Labs       Order Current Status    Respiratory Viral Panel In process    Blood Culture Preliminary result    Blood Culture Preliminary result            Hospital Course  Patient is a 68 y/o M with PMHx significant for HTN, HLD, GERD, essential tremors, who initially presented due to shortness of breath and fevers. On initial evaluation, the patient was febrile at 38.9C, tachycardic, and saturating 90% on RA. CXR demonstrated mild atelectatic changes of the left lung base without definite focal consolidation. The patient was placed on 2 L NC, started on ceftriaxone and azithromycin  and then admitted to the general medicine service for further management and evaluation.    Fevers resolved while on antibiotics, however the patient remained hypoxic requiring supplemental oxygen. An oxygen desaturation study was performed and demonstrated desaturating to 84% while on RA with ambulation and the patient was recommended 4 L NC. Given that there was resolution of fevers while on antibiotic therapy, the patient was deemed stable and medically appropriate for discharge from the hospital back to home. Instructions to follow up with primary care for hospital follow up and sleep study given. Prescription for doxycycline to complete 5 day total course of antibiotics and oxygen were given.    Pertinent Physical Exam At Time of Discharge  Physical Exam  Constitutional:       General: He is not in acute distress.     Appearance: He is obese. He is not ill-appearing.   HENT:      Head: Normocephalic and atraumatic.      Ears:      Comments: There is mild erythema of the bilateral ear canal with possible serous effusion behind the left tympanic membrane and mildly erythematous right tympanic membrane. Mastoids nontender to palpation.     Mouth/Throat:      Mouth: Mucous membranes are moist.      Pharynx: No posterior oropharyngeal erythema.   Eyes:      General: No scleral icterus.     Extraocular Movements: Extraocular movements intact.   Cardiovascular:      Rate and Rhythm: Normal rate and regular rhythm.      Pulses: Normal pulses.      Heart sounds: Normal heart sounds.   Pulmonary:      Effort: No respiratory distress.      Breath sounds: No wheezing.      Comments: Mild crackles at left lung base, on 2 L NC  Abdominal:      General: Abdomen is flat. Bowel sounds are normal. There is no distension.      Palpations: Abdomen is soft.      Tenderness: There is no abdominal tenderness. There is no guarding.   Musculoskeletal:         General: No swelling or tenderness.   Skin:     General: Skin is warm and  dry.   Neurological:      General: No focal deficit present.      Mental Status: He is alert and oriented to person, place, and time.   Psychiatric:         Mood and Affect: Mood normal.       Outpatient Follow-Up  Future Appointments   Date Time Provider Department Center   4/17/2025  3:00 PM COLIN Felton-CNP [id number]   9/9/2025  2:10 PM René Mckenna MD [id number] [place]     Discharge summary was discussed with attending physician,  Christian Lemons DO  Internal Medicine PGY-2

## 2025-04-16 NOTE — PROGRESS NOTES
Date/Time SpO2 Medical Gas Therapy Medical Gas Delivery Method Oxygen L/min Patient is on During the Study Patient Activity During Study    04/16/25 1105 91 %  None (Room air)  --  --  At rest     04/16/25 1107 84 %  None (Room air)  --  --  Ambulating     04/16/25 1109 91 %  Supplemental oxygen  Nasal cannula  4 L/min  Ambulating             Was a Home Oxygen Evaluation Performed? Yes  Based on the Home Oxygen Evaluation, Does the Patient Qualify for Home Oxygen Therapy? Yes            Recommendations:       Recommended Oxygen Dose with Activity is 4 L/min

## 2025-04-17 ENCOUNTER — APPOINTMENT (OUTPATIENT)
Dept: CARDIOLOGY | Facility: CLINIC | Age: 70
End: 2025-04-17
Payer: MEDICARE

## 2025-04-17 LAB
ATRIAL RATE: 94 BPM
ATRIAL RATE: 99 BPM
BACTERIA BLD CULT: NORMAL
BACTERIA BLD CULT: NORMAL
P AXIS: -2 DEGREES
P AXIS: 20 DEGREES
P OFFSET: 164 MS
P OFFSET: 191 MS
P ONSET: 105 MS
P ONSET: 133 MS
PR INTERVAL: 174 MS
PR INTERVAL: 180 MS
Q ONSET: 192 MS
Q ONSET: 223 MS
QRS COUNT: 15 BEATS
QRS COUNT: 17 BEATS
QRS DURATION: 146 MS
QRS DURATION: 150 MS
QT INTERVAL: 366 MS
QT INTERVAL: 368 MS
QTC CALCULATION(BAZETT): 460 MS
QTC CALCULATION(BAZETT): 469 MS
QTC FREDERICIA: 427 MS
QTC FREDERICIA: 432 MS
R AXIS: 58 DEGREES
R AXIS: 62 DEGREES
T AXIS: 32 DEGREES
T AXIS: 9 DEGREES
T OFFSET: 375 MS
T OFFSET: 407 MS
VENTRICULAR RATE: 94 BPM
VENTRICULAR RATE: 99 BPM

## 2025-04-18 ENCOUNTER — PATIENT OUTREACH (OUTPATIENT)
Dept: PRIMARY CARE | Facility: CLINIC | Age: 70
End: 2025-04-18
Payer: MEDICARE

## 2025-04-18 NOTE — PROGRESS NOTES
Discharge Facility: [facility]  Discharge Diagnosis: Acute hypoxic respiratory failure 2/2 viral URI,   Otitis Externa  Admission Date: 4/14/2025  Discharge Date: 4/16/2025    PCP Appointment Date: 4/22/2025  Specialist Appointment Date:   -cardiology 4/28/2025    Hospital Encounter and Summary Linked: Yes  ED to Hosp-Admission (Discharged) with Mara Miranda MD; Jack Ortega MD (04/14/2025)     Issues Requiring Follow-Up  AHRF requiring oxygen     Patient will need to follow up with primary care for hospital follow up, home oxygen continuation as needed, and for sleep study.     Doxycyline prescribed.    Hospital Course  Patient is a 68 y/o M with PMHx significant for HTN, HLD, GERD, essential tremors, who initially presented due to shortness of breath and fevers. On initial evaluation, the patient was febrile at 38.9C, tachycardic, and saturating 90% on RA. CXR demonstrated mild atelectatic changes of the left lung base without definite focal consolidation. The patient was placed on 2 L NC, started on ceftriaxone and azithromycin and then admitted to the general medicine service for further management and evaluation.     Fevers resolved while on antibiotics, however the patient remained hypoxic requiring supplemental oxygen. An oxygen desaturation study was performed and demonstrated desaturating to 84% while on RA with ambulation and the patient was recommended 4 L NC. Given that there was resolution of fevers while on antibiotic therapy, the patient was deemed stable and medically appropriate for discharge from the hospital back to home. Instructions to follow up with primary care for hospital follow up and sleep study given. Prescription for doxycycline to complete 5 day total course of antibiotics and oxygen were given.    Two attempts were made to reach patient within two business days after discharge. Voicemail left with contact information for patient to call back with any  non-emergent questions or concerns. Patient does have an appt with PCP for hospital follow up.

## 2025-04-19 LAB
BACTERIA BLD CULT: NORMAL
BACTERIA BLD CULT: NORMAL

## 2025-04-22 ENCOUNTER — APPOINTMENT (OUTPATIENT)
Dept: PRIMARY CARE | Facility: CLINIC | Age: 70
End: 2025-04-22
Payer: MEDICARE

## 2025-04-22 VITALS
BODY MASS INDEX: 34.83 KG/M2 | OXYGEN SATURATION: 93 % | DIASTOLIC BLOOD PRESSURE: 72 MMHG | WEIGHT: 301 LBS | HEIGHT: 78 IN | SYSTOLIC BLOOD PRESSURE: 128 MMHG | TEMPERATURE: 98.9 F | HEART RATE: 88 BPM

## 2025-04-22 DIAGNOSIS — J96.01 ACUTE HYPOXIC RESPIRATORY FAILURE: ICD-10-CM

## 2025-04-22 DIAGNOSIS — H60.509 ACUTE OTITIS EXTERNA, UNSPECIFIED LATERALITY, UNSPECIFIED TYPE: Primary | ICD-10-CM

## 2025-04-22 PROCEDURE — 1036F TOBACCO NON-USER: CPT

## 2025-04-22 PROCEDURE — 3074F SYST BP LT 130 MM HG: CPT

## 2025-04-22 PROCEDURE — 3008F BODY MASS INDEX DOCD: CPT

## 2025-04-22 PROCEDURE — 1159F MED LIST DOCD IN RCRD: CPT

## 2025-04-22 PROCEDURE — 1111F DSCHRG MED/CURRENT MED MERGE: CPT

## 2025-04-22 PROCEDURE — 99496 TRANSJ CARE MGMT HIGH F2F 7D: CPT

## 2025-04-22 PROCEDURE — 3078F DIAST BP <80 MM HG: CPT

## 2025-04-22 PROCEDURE — 1157F ADVNC CARE PLAN IN RCRD: CPT

## 2025-04-22 PROCEDURE — 1123F ACP DISCUSS/DSCN MKR DOCD: CPT

## 2025-04-22 ASSESSMENT — ENCOUNTER SYMPTOMS
FEVER: 0
FATIGUE: 0
SHORTNESS OF BREATH: 0

## 2025-04-22 NOTE — PROGRESS NOTES
"Patient: Bandar Castellanos  : 1955  PCP: René Mckenna MD  MRN: [id number]  Program: Transitional Care Management  Status: Enrolled  Effective Dates: 2025 - present  Responsible Staff: Delmy Love CMA  Social Drivers to be Addressed: No information to display         Bandar Castellanos is a 69 y.o. male presenting today for follow-up after being discharged from the hospital 6 days ago. The main problem requiring admission was acute hypoxic respiratory failure, otitis externa and URI. The discharge summary and/or Transitional Care Management documentation was reviewed. Medication reconciliation was performed as indicated via the \"Eduard as Reviewed\" timestamp.     Bandar Castellanos was contacted by Transitional Care Management services two days after his discharge. This encounter and supporting documentation was reviewed.    Review of Systems   Constitutional:  Negative for fatigue and fever.   Respiratory:  Negative for shortness of breath.         With movement mild dyspnea without reported hypoxia   Cardiovascular:  Negative for chest pain.       /72   Pulse 88   Temp 37.2 °C (98.9 °F) (Tympanic)   Ht 2.007 m (6' 7\")   Wt 137 kg (301 lb)   SpO2 93%   BMI 33.91 kg/m²     Physical Exam  Constitutional:       Appearance: Normal appearance.   HENT:      Head: Normocephalic.      Right Ear: Tympanic membrane, ear canal and external ear normal. There is no impacted cerumen.      Left Ear: Tympanic membrane, ear canal and external ear normal. There is no impacted cerumen.   Cardiovascular:      Rate and Rhythm: Normal rate and regular rhythm.   Pulmonary:      Effort: Pulmonary effort is normal. No respiratory distress.      Breath sounds: Normal breath sounds. No wheezing, rhonchi or rales.   Musculoskeletal:      Cervical back: Neck supple.   Skin:     General: Skin is warm and dry.   Neurological:      Mental Status: He is alert and oriented to person, place, and time.   Psychiatric:         " Mood and Affect: Mood normal.         The complexity of medical decision making for this patient's transitional care is moderate.    Assessment/Plan   Diagnoses and all orders for this visit:  Acute otitis externa, unspecified laterality, unspecified type  Acute hypoxic respiratory failure  -     In-Center Sleep Study (Non-Sleep Provider); Future    Reviewed hospital documentation, results and thoroughly discussed these with him and his wife today. He will keep his appointments with cardiology-already planned and I went ahead and placed an order for sleep apnea testing. Finished antibiotics; ears look clear today no concerns and his lungs are clear as well. He needs to follow up with pulmonary-this needs scheduled for surveillance of lung nodules. He will follow up with his PCP as regularly scheduled. Overall he appears to be improving on clinical examination today.     ADDENDUM: He's requiring a sleep study as in the hospital documentation, there's a concern for obesity hypoventilation syndrome as well as nocturnal hypoxia.

## 2025-04-25 ENCOUNTER — PATIENT OUTREACH (OUTPATIENT)
Dept: PRIMARY CARE | Facility: CLINIC | Age: 70
End: 2025-04-25
Payer: MEDICARE

## 2025-04-28 ENCOUNTER — APPOINTMENT (OUTPATIENT)
Dept: CARDIOLOGY | Facility: CLINIC | Age: 70
End: 2025-04-28
Payer: MEDICARE

## 2025-04-28 VITALS
BODY MASS INDEX: 34.41 KG/M2 | WEIGHT: 297.4 LBS | SYSTOLIC BLOOD PRESSURE: 114 MMHG | DIASTOLIC BLOOD PRESSURE: 72 MMHG | HEART RATE: 94 BPM | HEIGHT: 78 IN

## 2025-04-28 DIAGNOSIS — I10 PRIMARY HYPERTENSION: Primary | ICD-10-CM

## 2025-04-28 DIAGNOSIS — I27.20 PULMONARY HYPERTENSION (MULTI): ICD-10-CM

## 2025-04-28 DIAGNOSIS — E78.2 MIXED HYPERLIPIDEMIA: ICD-10-CM

## 2025-04-28 PROCEDURE — 3078F DIAST BP <80 MM HG: CPT | Performed by: NURSE PRACTITIONER

## 2025-04-28 PROCEDURE — 99205 OFFICE O/P NEW HI 60 MIN: CPT | Performed by: NURSE PRACTITIONER

## 2025-04-28 PROCEDURE — 3074F SYST BP LT 130 MM HG: CPT | Performed by: NURSE PRACTITIONER

## 2025-04-28 PROCEDURE — 1160F RVW MEDS BY RX/DR IN RCRD: CPT | Performed by: NURSE PRACTITIONER

## 2025-04-28 PROCEDURE — 1123F ACP DISCUSS/DSCN MKR DOCD: CPT | Performed by: NURSE PRACTITIONER

## 2025-04-28 PROCEDURE — 3008F BODY MASS INDEX DOCD: CPT | Performed by: NURSE PRACTITIONER

## 2025-04-28 PROCEDURE — 1159F MED LIST DOCD IN RCRD: CPT | Performed by: NURSE PRACTITIONER

## 2025-04-28 PROCEDURE — 1111F DSCHRG MED/CURRENT MED MERGE: CPT | Performed by: NURSE PRACTITIONER

## 2025-04-28 PROCEDURE — 1157F ADVNC CARE PLAN IN RCRD: CPT | Performed by: NURSE PRACTITIONER

## 2025-04-29 NOTE — PROGRESS NOTES
Chief Complaint:  Chief Complaint   Patient presents with    New Patient Visit    Hypertension       Bandar Castellanos is a 69 y.o. male that presents to the office today with his wife for new patient cardiac evaluation.  He was referred by Dr. Ephraim Knight DO to rule out pulmonary hypertension after recent hospital admission.  He has a past medical history of hyperlipidemia, hypertension, essential tremors, GERD, incisional hernia.  He denies tobacco use, vaping, alcohol use, recreational drug use or caffeine intake.  Reports that his diet is fair.  Denies regular exercise.  Denies previous [facility] cardiac evaluation.  Unknown family history of CAD.      4- admitted [facility]  Acute hypoxic respiratory failure 2/2 viral URI, suspected community-acquired pneumonia.  Otitis Externa.  CT of the chest showed  Dilated main pulmonary artery measuring 3.5 cm which can be associated with pulmonary hypertension.  He was discharged on home O2 to follow-up with pulmonology as an outpatient.  He was referred to cardiology for evaluation of pulmonary hypertension secondary to CT of the chest results.    He denies any chest pain, chest pressure chest tightness, lightheadedness, dizziness or palpitations.  He states he is no longer using home O2 but does admit to some exertional shortness of breath which his wife agrees to.  He denies lower extremity edema but his wife feels that he has had problems with ongoing lower extremity edema for some time.      Testing Reviewed  4/14/2025 EKG  Sinus tachycardia  bpm   Right bundle branch block  Abnormal ECG     4/14/2025 CT chest  1. Mildly limited study due to suboptimal bolus timing. No discrete  filling defects within the main pulmonary artery or its branches to  proximal segmental level. Please note that, assessment of distal  segmental and subsegmental branches is limited and small peripheral  emboli are not entirely excluded  2. Dilated main pulmonary artery measuring 3.5 cm  which can be  associated with pulmonary hypertension.  3. Pulmonary nodules measuring up to 7 mm as described above.  Recommend follow-up CT chest in 6 months to document stability.  Incidental Finding:  A solid non-calcified pulmonary nodule measuring  6-8 mm .        CBC:   Lab Results   Component Value Date    WBC 7.0 04/16/2025    RBC 4.43 (L) 04/16/2025    HGB 13.0 (L) 04/16/2025    HCT 39.3 (L) 04/16/2025     04/16/2025        CMP:    Lab Results   Component Value Date     04/16/2025    K 3.8 04/16/2025     04/16/2025    CO2 28 04/16/2025    BUN 12 04/16/2025    CREATININE 0.84 04/16/2025    GLUCOSE 99 04/16/2025    CALCIUM 8.9 04/16/2025       Lipid Profile:    Lab Results   Component Value Date    TRIG 358 (H) 04/15/2024    HDL 37.0 04/15/2024    LDLCALC 71 04/15/2024       BMP:  Lab Results   Component Value Date     04/16/2025     (L) 04/15/2025     (L) 04/14/2025    K 3.8 04/16/2025    K 3.7 04/15/2025    K 4.0 04/14/2025     04/16/2025     04/15/2025     04/14/2025    CO2 28 04/16/2025    CO2 23 04/15/2025    CO2 22 04/14/2025    BUN 12 04/16/2025    BUN 14 04/15/2025    BUN 15 04/14/2025    CREATININE 0.84 04/16/2025    CREATININE 0.88 04/15/2025    CREATININE 0.85 04/14/2025       CBC:  Lab Results   Component Value Date    WBC 7.0 04/16/2025    WBC 8.5 04/15/2025    WBC 11.1 04/14/2025    RBC 4.43 (L) 04/16/2025    RBC 4.13 (L) 04/15/2025    RBC 4.83 04/14/2025    HGB 13.0 (L) 04/16/2025    HGB 12.2 (L) 04/15/2025    HGB 14.3 04/14/2025    HCT 39.3 (L) 04/16/2025    HCT 36.9 (L) 04/15/2025    HCT 42.0 04/14/2025    MCV 89 04/16/2025    MCV 89 04/15/2025    MCV 87 04/14/2025    MCH 29.3 04/16/2025    MCH 29.5 04/15/2025    MCH 29.6 04/14/2025    MCHC 33.1 04/16/2025    MCHC 33.1 04/15/2025    MCHC 34.0 04/14/2025    RDW 14.4 04/16/2025    RDW 14.5 04/15/2025    RDW 14.5 04/14/2025     04/16/2025     04/15/2025     04/14/2025  "      Hepatic Function Panel:    Lab Results   Component Value Date    ALKPHOS 82 04/14/2025    ALT 29 04/14/2025    AST 24 04/14/2025    PROT 7.4 04/14/2025    BILITOT 0.9 04/14/2025       Magnesium:    Lab Results   Component Value Date    MG 2.09 04/15/2025         BNP:   Lab Results   Component Value Date    BNP 52 04/14/2025        PT/INR:    Lab Results   Component Value Date    PROTIME CANCELED 07/05/2023    INR CANCELED 07/05/2023       Cardiac Enzymes:    Lab Results   Component Value Date    TROPHS 10 04/14/2025    TROPHS 8 04/14/2025    TROPHS 7 04/12/2025         Problem List[1]    Social History[2]    Medical History[3]    Current Medications[4]    Fenofibrate    Family History[5]    Surgical History[6]       Review of systems  Constitutional: No weight loss, fever, chills, weakness or fatigue  HEENT: No visual loss, blurred vision, double vision or yellow sclerae  Skin: No rash or itching  Cardiovascular: No chest pain, pressure or discomfort, No palpitations or edema.  Respiratory: Positive for shortness of breath, denies cough or sputum  Gastrointestinal: No nausea, vomiting or diarrhea. No bloody or dark tarry stools.  Neurological: No headache, lightheadedness, dizziness, syncope.   Musculoskeletal: No muscle, back pain, joint pain or stiffness.  Hematologic: No anemia, bleeding or bruising.    /72 (BP Location: Left arm, Patient Position: Sitting)   Pulse 94   Ht 2.007 m (6' 7\")   Wt 135 kg (297 lb 6.4 oz)   BMI 33.50 kg/m²     Physical Exam  Constitutional: Well developed, awake/alert x 3, no distress.  Head/Neck: No JVD, No bruits  Respiratory/Thorax: patent airways, CTAB, normal breath sounds with good expansion.  Cardiovascular: Regular rate and rhythm, no murmurs, normal S1 and S2,   Gastrointestinal: Non distended, soft, non-tender, no rebound tenderness or guarding.  Extremities: No cyanosis, edema.   Neurological: Alert and oriented x 3. Moves extremities spontaneous with " purpose.  Psychological: Appropriate mood and behavior  Skin: Warm and Dry. No lesions or rashes.     Assessment/Plan  Shortness of breath; reports exertional shortness of breath.  Following with pulmonology  Abnormal CT of the chest suggestive of pulmonary hypertension.  Obtain echocardiogram to assess for valve/pulm function along with ruling out pulmonary hypertension  Hypertension; well-controlled  Hyperlipidemia; continue Lipitor  Follow-up in the office after echocardiogram for results and further recommendations      Please excuse any errors in grammar or translation related to dictation, voice recognition software was used to prepare this document.         [1]   Patient Active Problem List  Diagnosis    Calcaneal spur of left foot    ED (erectile dysfunction)    Mixed hyperlipidemia    Hypertriglyceridemia    Incisional hernia, without obstruction or gangrene    Piriformis syndrome, left    Psoriasis, guttate    Tremor, essential    Rectus diastasis    Lumbosacral spondylosis without myelopathy    Sebaceous cyst    Obesity, unspecified    Primary hypertension    Esophageal dysphagia    Gastroesophageal reflux disease without esophagitis    Multiple nevi    Calcaneal spur    Pulmonary hypertension (Multi)   [2]   Social History  Tobacco Use    Smoking status: Former     Types: Cigarettes    Smokeless tobacco: Never   Vaping Use    Vaping status: Never Used   Substance Use Topics    Alcohol use: Not Currently     Comment: rare    Drug use: Never   [3]   Past Medical History:  Diagnosis Date    Foot pain 02/02/2023    GERD (gastroesophageal reflux disease)     Heartburn 02/02/2023    Hordeolum external 02/02/2023    Hyperlipidemia     Lumbar back pain 02/02/2023    Other specified health status     No pertinent past medical history    Plantar fasciitis, left 02/02/2023    Sprain of ankle, left 02/02/2023    Umbilical hernia 02/02/2023   [4]   Current Outpatient Medications:     atorvastatin (Lipitor) 20 mg  tablet, Take 1 tablet (20 mg) by mouth once daily., Disp: 90 tablet, Rfl: 1    magnesium 250 mg tablet, Take by mouth 2 times a day., Disp: , Rfl:     multivitamin capsule, Take by mouth., Disp: , Rfl:     omega 3-dha-epa-fish oil (Fish OiL) 1,000 mg (120 mg-180 mg) capsule, Take by mouth., Disp: , Rfl:     omeprazole (PriLOSEC) 40 mg DR capsule, Take 1 capsule (40 mg) by mouth once daily in the morning. Take before meals. Do not crush or chew., Disp: 30 capsule, Rfl: 1    primidone (Mysoline) 50 mg tablet, Take 1 tablet (50 mg) by mouth once daily at bedtime., Disp: 90 tablet, Rfl: 1  [5]   Family History  Problem Relation Name Age of Onset    Cancer Father      Stroke Father     [6]   Past Surgical History:  Procedure Laterality Date    OTHER SURGICAL HISTORY  11/30/2018    Colonoscopy

## 2025-05-18 NOTE — PROGRESS NOTES
Patient: Bandar Castellanos    [id number]  : 1955 -- AGE 69 y.o.    Provider: COLIN Cortes-CNP     Location [facility]   Service Date: 2025              [facility]  New Visit Note      HISTORY OF PRESENT ILLNESS     The patient's referring provider is: Melody Wagner APRN-*    HISTORY OF PRESENT ILLNESS   Bandar Castellanos is a 69 y.o. male who presents to a [facility] for an evaluation with concerns of Pneumonia / Lung Nodules. I have independently interviewed and examined the patient in the office and reviewed available records.    Current History    Recent admission for Acute hypoxic respiratory failure 2/2 viral URI.  He developed acute respiratory failure and was discharged with O2. He is using intermittently  An oxygen desaturation study was performed and demonstrated desaturating to 84% while on RA with ambulation and the patient was recommended 4 L NC.     On today's visit, the patient reports feeling better.Fever and chills, body aches and headaches resolved.  Denies SOB at rest.  He is able to tolerate walking up stairs, he has improved. He tried mowing the lawn after a couple minutes he needed to stop. At baseline,  has dyspnea on exertion, but none at rest. Symptoms started many years ago, but has mostly been stable. Currently sits for most of the day, works inside the house, but does not carry loads and do strenuous exercise. They are active in her everyday life and carries loads and does strenuous exercise. He is short of breath when hurrying on level ground or walking up a slight hill (mMRC 1).   Denies orthopnea, has bilateral feet swelling. . Weight has been mostly stable.  Denies  chronic cough, wheezing, and no sputum. No night cough. No hemoptysis. No fever or shivering chills. . Has no runny nose, or a tingling sensation in the back of his throat.  Denies chest pain or  heartburn.    Previous pulmonary history:  no history of recurrent infections, or lung disease as a child.  No previous lung hx, never on oxygen or inhaler therapy.   Has had PNA 5 times throughout lifetime   Has supplemental O2 -   At home pulse ox RA 90-95%   His wife has checked him at night and he was 89-92%   They sleep elevated in an adjustable bed and snores less and using humidifier     Inhalers/nebulized medications: none    Hospitalization History: Not been hospitalized over the last year for breathing related problem.    Sleep history:   Complains of snoring, apnea, feeling tired during the day, and taking naps during the day.   His wife has checked him at night and he was 89-92%     ALLERGIES AND MEDICATIONS     ALLERGIES  Allergies[1]    MEDICATIONS  Current Medications[2]      PAST HISTORY     PAST MEDICAL HISTORY  He  has a past medical history of Foot pain (02/02/2023), GERD (gastroesophageal reflux disease), Heartburn (02/02/2023), Hordeolum external (02/02/2023), Hyperlipidemia, Lumbar back pain (02/02/2023), Other specified health status, Plantar fasciitis, left (02/02/2023), Sprain of ankle, left (02/02/2023), and Umbilical hernia (02/02/2023).   Obesity     PAST SURGICAL HISTORY  Surgical History[3]    IMMUNIZATION HISTORY  Immunization History   Administered Date(s) Administered    Flu vaccine, quadrivalent, high-dose, preservative free, age 65y+ (FLUZONE) 09/12/2023    Flu vaccine, trivalent, preservative free, HIGH-DOSE, age 65y+ (Fluzone) 09/09/2024    Influenza, seasonal, injectable 12/21/2020    Pfizer COVID-19 vaccine, 12 years and older, (30mcg/0.3mL) (Comirnaty) 11/11/2023    Pfizer COVID-19 vaccine, bivalent, age 12 years and older (30 mcg/0.3 mL) 11/25/2022    Pfizer Purple Cap SARS-CoV-2 03/05/2021, 03/26/2021, 10/07/2021    Pneumococcal conjugate vaccine, 13-valent (PREVNAR 13) 06/22/2020    Pneumococcal polysaccharide vaccine, 23-valent, age 2 years and older (PNEUMOVAX 23)  11/02/2017    SARS-CoV-2, Unspecified 05/12/2022    Td vaccine, age 7 years and older (TDVAX) 11/02/2017    Zoster vaccine, recombinant, adult (SHINGRIX) 12/21/2020, 03/14/2022       SOCIAL HISTORY  He  reports that he has quit smoking. His smoking use included cigarettes. He has never used smokeless tobacco. He reports that he does not currently use alcohol. He reports that he does not use drugs. 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  DOES/DOES NOT EC: does not have exposure to birds or exotic animals.    FAMILY HISTORY  Family History[4]  DOES/DOES NOT EC: does have a family history of pulmonary disease. Father had cancer - lung   DOES/DOES NOT EC: does have a family history of cancer.  DOES/DOES NOT EC: does not have a family history of autoimmune disorders.    RESULTS/DATA     Pulmonary Function Test Results   NO Testing Done    Chest Radiograph     XR chest 1 view 04/14/2025      FINDINGS:  Slight worsening atelectasis at the left lung base. Small left  pleural effusion noted. The lungs are otherwise clear though the  posterior lung bases are obscured by elevated diaphragm due to  shallow inspiration. The heart is normal in size. No pneumothorax.    Impression  Slight worsening atelectasis at the left lung base with pleural  effusion.    Chest CT Scan     CT angio chest for pulmonary embolism 04/12/2025    FINDINGS:  POTENTIAL LIMITATIONS OF THE STUDY: The assessment is limited by  suboptimal contrast opacification of pulmonary arteries.    HEART AND VESSELS:  No discrete filling defects within the main pulmonary artery or its  branches to  proximal segmental level. Please note that, assessment  of distal segmental and  subsegmental branches is limited and small  peripheral emboli are not entirely excluded. Dilated main pulmonary  artery measuring 3.5 cm in diameter.    The thoracic aorta normal in course and caliber.  Severe coronary artery calcifications are seen. Please note, the  study is not optimized for evaluation of coronary arteries.    The cardiac chambers are not enlarged.    There is no pericardial effusion seen.    MEDIASTINUM AND ESTHER, LOWER NECK AND AXILLA:  The visualized thyroid gland is within normal limits.  1.6 cm AP window lymph node on image 112. 1.4 cm right hilar lymph  node on image 136. These are favored to be reactive in etiology.  Esophagus appears within normal limits as seen.    LUNGS AND AIRWAYS:  The trachea and central airways are patent. No endobronchial lesion  is seen. Right apical linear subpleural scarring changes.    Lungs are clear without evidence of focal consolidation, pleural  effusion, or pneumothorax. Few small ground-glass pulmonary nodules  are visualized in the inferior aspect of the left upper and left  lower lobes likely representing mild aspiration or infectious  bronchiolitis. Representative pulmonary nodules as described below:    7 mm left lower lobe nodule on image 244.  4 mm right upper lobe nodule on image 79.  5 mm right middle lobe nodule on image 190.      UPPER ABDOMEN:  The visualized subdiaphragmatic structures demonstrate no remarkable  findings.    CHEST WALL AND OSSEOUS STRUCTURES:  Chest wall is within normal limits. Mild S shaped thoracolumbar  scoliosis. No acute osseous pathology or suspicious osseous lesions.    Impression  1. Mildly limited study due to suboptimal bolus timing. No discrete  filling defects within the main pulmonary artery or its branches to  proximal segmental level. Please note that, assessment of distal  segmental and subsegmental branches is limited and small peripheral  emboli are not entirely excluded  2. Dilated main pulmonary artery  "measuring 3.5 cm which can be  associated with pulmonary hypertension.  3. Pulmonary nodules measuring up to 7 mm as described above.  Recommend follow-up CT chest in 6 months to document stability.  Incidental Finding:  A solid non-calcified pulmonary nodule measuring  6-8 mm .        Echocardiogram     Ordered 4/28/25 - NOT COMPLETED         REVIEW OF SYSTEMS     REVIEW OF SYSTEMS  Review of Systems   Respiratory:  Positive for apnea, cough and shortness of breath.          PHYSICAL EXAM     VITAL SIGNS: /70   Pulse 87   Resp 18   Ht 2.007 m (6' 7\")   Wt 135 kg (297 lb)   SpO2 91%   BMI 33.46 kg/m²      CURRENT WEIGHT: [unfilled]  BMI: [unfilled]  PREVIOUS WEIGHTS:  Wt Readings from Last 3 Encounters:   05/20/25 135 kg (297 lb)   04/28/25 135 kg (297 lb 6.4 oz)   04/22/25 137 kg (301 lb)       Physical Exam    ASSESSMENT/PLAN     Mr. Castellanos is a 69 y.o. male and  has a past medical history of Foot pain (02/02/2023), GERD (gastroesophageal reflux disease), Heartburn (02/02/2023), Hordeolum external (02/02/2023), Hyperlipidemia, Lumbar back pain (02/02/2023), Other specified health status, Plantar fasciitis, left (02/02/2023), Sprain of ankle, left (02/02/2023), and Umbilical hernia (02/02/2023). He was referred to the [facility] for evaluation of pulm nodules, former smoker of 10 pack year stopped 25 year history    Problem List and Orders      Assessment and Plan / Recommendations:  Problem List Items Addressed This Visit    None  Visit Diagnoses         Multiple pulmonary nodules                     Dilated main pulmonary artery measuring 3.5 cm which can be  associated with pulmonary hypertension, previous snoring laying supine - sleeps in elevated bed, nocturnal hypoxia  - check in lab sleep test  - agree with Echo to check RVSP    Former smoker, multiple episodes of PNA and occ hypoxia  - check Obtain pulmonary function test, FENO and 6 minute walk test   - check " walk test to determine if needs O2 during ambulation   - ensure up to date with immunizations           CT chest in April with 7 mm left lower lobe nodule, 4 mm right upper lobe nodule , 5 mm right middle lobe nodule   - repeat Ct chest 3-6 months, after July 12 2025     Thank you for visiting the Pulmonary clinic today!       Return to clinic after 6-8  weeks and after PFTs, CT scan complete  or sooner if needed   Keturah Lobo CNP  My office number is [phone] -     Call to schedule  for radiology - CT scans/PFTs etc at  [phone]  General scheduling  [phone]       Any test results will be discussed at next visit -- please make sure to make a follow up appt after testing.            [1]   Allergies  Allergen Reactions    Fenofibrate Unknown   [2]   Current Outpatient Medications   Medication Sig Dispense Refill    atorvastatin (Lipitor) 20 mg tablet Take 1 tablet (20 mg) by mouth once daily. 90 tablet 1    magnesium 250 mg tablet Take by mouth 2 times a day.      multivitamin capsule Take by mouth.      omega 3-dha-epa-fish oil (Fish OiL) 1,000 mg (120 mg-180 mg) capsule Take by mouth.      omeprazole (PriLOSEC) 40 mg DR capsule Take 1 capsule (40 mg) by mouth once daily in the morning. Take before meals. Do not crush or chew. 30 capsule 1    primidone (Mysoline) 50 mg tablet Take 1 tablet (50 mg) by mouth once daily at bedtime. 90 tablet 1     No current facility-administered medications for this visit.   [3]   Past Surgical History:  Procedure Laterality Date    OTHER SURGICAL HISTORY  11/30/2018    Colonoscopy   [4]   Family History  Problem Relation Name Age of Onset    Cancer Father      Stroke Father

## 2025-05-20 ENCOUNTER — APPOINTMENT (OUTPATIENT)
Facility: CLINIC | Age: 70
End: 2025-05-20
Payer: MEDICARE

## 2025-05-20 VITALS
SYSTOLIC BLOOD PRESSURE: 117 MMHG | BODY MASS INDEX: 34.36 KG/M2 | DIASTOLIC BLOOD PRESSURE: 70 MMHG | RESPIRATION RATE: 18 BRPM | HEIGHT: 78 IN | HEART RATE: 87 BPM | OXYGEN SATURATION: 91 % | WEIGHT: 297 LBS

## 2025-05-20 DIAGNOSIS — G47.34 NOCTURNAL HYPOXIA: ICD-10-CM

## 2025-05-20 DIAGNOSIS — R91.8 MULTIPLE PULMONARY NODULES: ICD-10-CM

## 2025-05-20 DIAGNOSIS — R06.00 DYSPNEA, UNSPECIFIED TYPE: ICD-10-CM

## 2025-05-20 DIAGNOSIS — R06.83 SNORING: Primary | ICD-10-CM

## 2025-05-20 DIAGNOSIS — Z87.01 HISTORY OF RECENT PNEUMONIA: ICD-10-CM

## 2025-05-20 PROCEDURE — 3008F BODY MASS INDEX DOCD: CPT | Performed by: NURSE PRACTITIONER

## 2025-05-20 PROCEDURE — G8433 SCR FOR DEP NOT CPT DOC RSN: HCPCS | Performed by: NURSE PRACTITIONER

## 2025-05-20 PROCEDURE — 3074F SYST BP LT 130 MM HG: CPT | Performed by: NURSE PRACTITIONER

## 2025-05-20 PROCEDURE — 1036F TOBACCO NON-USER: CPT | Performed by: NURSE PRACTITIONER

## 2025-05-20 PROCEDURE — 99204 OFFICE O/P NEW MOD 45 MIN: CPT | Performed by: NURSE PRACTITIONER

## 2025-05-20 PROCEDURE — 3078F DIAST BP <80 MM HG: CPT | Performed by: NURSE PRACTITIONER

## 2025-05-20 PROCEDURE — 1160F RVW MEDS BY RX/DR IN RCRD: CPT | Performed by: NURSE PRACTITIONER

## 2025-05-20 PROCEDURE — 1159F MED LIST DOCD IN RCRD: CPT | Performed by: NURSE PRACTITIONER

## 2025-05-20 RX ORDER — ALBUTEROL SULFATE 0.83 MG/ML
3 SOLUTION RESPIRATORY (INHALATION) ONCE
OUTPATIENT
Start: 2025-05-20 | End: 2025-05-20

## 2025-05-20 RX ORDER — ALBUTEROL SULFATE 90 UG/1
1 INHALANT RESPIRATORY (INHALATION) ONCE
OUTPATIENT
Start: 2025-05-20

## 2025-05-20 ASSESSMENT — PATIENT HEALTH QUESTIONNAIRE - PHQ9
2. FEELING DOWN, DEPRESSED OR HOPELESS: NOT AT ALL
2. FEELING DOWN, DEPRESSED OR HOPELESS: NOT AT ALL
1. LITTLE INTEREST OR PLEASURE IN DOING THINGS: NOT AT ALL
1. LITTLE INTEREST OR PLEASURE IN DOING THINGS: NOT AT ALL
SUM OF ALL RESPONSES TO PHQ9 QUESTIONS 1 AND 2: 0
SUM OF ALL RESPONSES TO PHQ9 QUESTIONS 1 AND 2: 0

## 2025-05-20 ASSESSMENT — COLUMBIA-SUICIDE SEVERITY RATING SCALE - C-SSRS
6. HAVE YOU EVER DONE ANYTHING, STARTED TO DO ANYTHING, OR PREPARED TO DO ANYTHING TO END YOUR LIFE?: NO
2. HAVE YOU ACTUALLY HAD ANY THOUGHTS OF KILLING YOURSELF?: NO
1. IN THE PAST MONTH, HAVE YOU WISHED YOU WERE DEAD OR WISHED YOU COULD GO TO SLEEP AND NOT WAKE UP?: NO
6. HAVE YOU EVER DONE ANYTHING, STARTED TO DO ANYTHING, OR PREPARED TO DO ANYTHING TO END YOUR LIFE?: NO
1. IN THE PAST MONTH, HAVE YOU WISHED YOU WERE DEAD OR WISHED YOU COULD GO TO SLEEP AND NOT WAKE UP?: NO
2. HAVE YOU ACTUALLY HAD ANY THOUGHTS OF KILLING YOURSELF?: NO

## 2025-05-20 ASSESSMENT — ENCOUNTER SYMPTOMS
LOSS OF SENSATION IN FEET: 1
APNEA: 1
DEPRESSION: 0
OCCASIONAL FEELINGS OF UNSTEADINESS: 1
COUGH: 1
SHORTNESS OF BREATH: 1

## 2025-05-20 NOTE — PATIENT INSTRUCTIONS
Dilated main pulmonary artery measuring 3.5 cm which can be  associated with pulmonary hypertension, previous snoring laying supine - sleeps in elevated bed, nocturnal hypoxia  - check in lab sleep test  - agree with Echo to check RVSP    Former smoker, multiple episodes of PNA and occ hypoxia  - check Obtain pulmonary function test, FENO and 6 minute walk test   - check walk test to determine if needs O2 during ambulation   - ensure up to date with immunizations           CT chest in April with 7 mm left lower lobe nodule, 4 mm right upper lobe nodule , 5 mm right middle lobe nodule   - repeat Ct chest 3-6 months, after July 12 2025     Thank you for visiting the Pulmonary clinic today!       Return to clinic after 6-8  weeks and after PFTs, CT scan complete  or sooner if needed   Keturah Lobo CNP  My office number is [phone] -     Call to schedule  for radiology - CT scans/PFTs etc at  [phone]  General scheduling  [phone]       Any test results will be discussed at next visit -- please make sure to make a follow up appt after testing.

## 2025-05-27 ENCOUNTER — ANCILLARY PROCEDURE (OUTPATIENT)
Dept: CARDIOLOGY | Facility: HOSPITAL | Age: 70
End: 2025-05-27
Payer: MEDICARE

## 2025-05-27 DIAGNOSIS — I27.20 PULMONARY HYPERTENSION (MULTI): ICD-10-CM

## 2025-05-27 DIAGNOSIS — I10 PRIMARY HYPERTENSION: ICD-10-CM

## 2025-05-27 PROCEDURE — 2500000004 HC RX 250 GENERAL PHARMACY W/ HCPCS (ALT 636 FOR OP/ED): Performed by: NURSE PRACTITIONER

## 2025-05-27 PROCEDURE — 93306 TTE W/DOPPLER COMPLETE: CPT | Performed by: INTERNAL MEDICINE

## 2025-05-27 PROCEDURE — C8929 TTE W OR WO FOL WCON,DOPPLER: HCPCS

## 2025-05-27 RX ADMIN — HUMAN ALBUMIN MICROSPHERES AND PERFLUTREN 0.5 ML: 10; .22 INJECTION, SOLUTION INTRAVENOUS at 14:53

## 2025-05-28 LAB
AORTIC VALVE MEAN GRADIENT: 7 MMHG
AORTIC VALVE PEAK VELOCITY: 1.74 M/S
AV PEAK GRADIENT: 12 MMHG
AVA (PEAK VEL): 2.87 CM2
AVA (VTI): 2.84 CM2
EJECTION FRACTION APICAL 4 CHAMBER: 61.1
EJECTION FRACTION: 63 %
LEFT VENTRICLE INTERNAL DIMENSION DIASTOLE: 4.57 CM (ref 3.5–6)
LEFT VENTRICULAR OUTFLOW TRACT DIAMETER: 2.3 CM
LV EJECTION FRACTION BIPLANE: 62 %
MITRAL VALVE E/A RATIO: 0.77
MITRAL VALVE E/E' RATIO: 3.4
RIGHT VENTRICLE FREE WALL PEAK S': 14.4 CM/S
TRICUSPID ANNULAR PLANE SYSTOLIC EXCURSION: 1.9 CM

## 2025-05-29 ENCOUNTER — TELEPHONE (OUTPATIENT)
Dept: RADIOLOGY | Facility: HOSPITAL | Age: 70
End: 2025-05-29
Payer: MEDICARE

## 2025-06-04 ENCOUNTER — APPOINTMENT (OUTPATIENT)
Dept: CARDIOLOGY | Facility: CLINIC | Age: 70
End: 2025-06-04
Payer: MEDICARE

## 2025-06-04 VITALS
BODY MASS INDEX: 33.23 KG/M2 | HEART RATE: 103 BPM | WEIGHT: 295 LBS | DIASTOLIC BLOOD PRESSURE: 88 MMHG | SYSTOLIC BLOOD PRESSURE: 138 MMHG

## 2025-06-04 DIAGNOSIS — I45.10 UNSPECIFIED RIGHT BUNDLE-BRANCH BLOCK: ICD-10-CM

## 2025-06-04 DIAGNOSIS — I51.7 ENLARGED RV (RIGHT VENTRICLE): Primary | ICD-10-CM

## 2025-06-04 DIAGNOSIS — K43.2 INCISIONAL HERNIA, WITHOUT OBSTRUCTION OR GANGRENE: ICD-10-CM

## 2025-06-04 DIAGNOSIS — Q67.6 PECTUS EXCAVATUM: ICD-10-CM

## 2025-06-04 DIAGNOSIS — I28.8 DILATATION OF PULMONIC ARTERY (MULTI): ICD-10-CM

## 2025-06-04 DIAGNOSIS — I10 PRIMARY HYPERTENSION: ICD-10-CM

## 2025-06-04 DIAGNOSIS — R06.02 SHORTNESS OF BREATH: ICD-10-CM

## 2025-06-04 DIAGNOSIS — I51.9 DYSFUNCTION OF RIGHT CARDIAC VENTRICLE: ICD-10-CM

## 2025-06-04 DIAGNOSIS — K40.90 INGUINAL HERNIA WITHOUT OBSTRUCTION OR GANGRENE, RECURRENCE NOT SPECIFIED, UNSPECIFIED LATERALITY: ICD-10-CM

## 2025-06-04 DIAGNOSIS — I27.20 PULMONARY HYPERTENSION (MULTI): ICD-10-CM

## 2025-06-04 DIAGNOSIS — I51.7 ATRIAL DILATION: ICD-10-CM

## 2025-06-04 DIAGNOSIS — I25.10 ATHEROSCLEROSIS OF NATIVE CORONARY ARTERY OF NATIVE HEART WITHOUT ANGINA PECTORIS: ICD-10-CM

## 2025-06-04 DIAGNOSIS — E78.2 MIXED HYPERLIPIDEMIA: ICD-10-CM

## 2025-06-04 DIAGNOSIS — R94.39 ABNORMAL RESULT OF OTHER CARDIOVASCULAR FUNCTION STUDY: ICD-10-CM

## 2025-06-04 DIAGNOSIS — G25.0 TREMOR, ESSENTIAL: ICD-10-CM

## 2025-06-04 PROBLEM — R55 SYNCOPE: Status: ACTIVE | Noted: 2025-06-04

## 2025-06-04 RX ORDER — VERAPAMIL HYDROCHLORIDE 120 MG/1
120 TABLET ORAL DAILY
Qty: 90 TABLET | Refills: 1 | Status: SHIPPED | OUTPATIENT
Start: 2025-06-04 | End: 2026-06-04

## 2025-06-04 NOTE — PROGRESS NOTES
CARDIOLOGY CONSULTATION NOTE       Patient:    Bandar Castellanos    YOB: 1955  MRN:    [id number]    Date:   6/4/2025    Primary Physician: Ephraim Knight DO       REASON FOR CONSULT / CHIEF COMPLAINT:      Cardiology consultation for pulmonary hypertension, dyspnea on exertion and palpitations.    IMPRESSION:      Dyspnea on exertion  Palpitations  Snoring  Edema  Pulmonary hypertension with increased pulmonary artery by CT  Right right ventricular enlargement, echocardiogram, 4/2025.  Preserved left ventricular function, LVEF 60 to 65%, echocardiogram, 4/2025.  Pneumonia for 2025  Coronary calcifications, CT, 4/ 2025.  Abnormal rest electrocardiogram  Tachycardia  Right bundle branch block  Hypertension  Hyperlipidemia  GERD  Tremors  Lumbar disc disease anemia  Incisional hernia  Past tobacco abuse  Otherwise as per assessment below.    RECOMMENDATIONS:      Patient has above-noted history and findings.  He has cardiovascular risk factors and unknown family history of coronary artery disease with abnormal echocardiogram noting no right ventricular enlargement and hypokinesia with preserved left ventricular contractility.  Dilated pulmonary artery.  Given these would suggest further cardiac assessment with cardiac MR, pulmonary function studies,'s formal CT calcium scoring, Lexiscan perfusion stress testing and home sleep study.    Further recommendation will rendered following.    Exercise dietary program.    Hydration.    [employer] portal use was encouraged.    We will plan to see back after the above testing with Laboratory Studies and ECG as noted.     Patient will follow up with their primary physician for general care.    The patient knows to contact medical care earlier if need be.      HPI:     Bandar Castellanos was seen in cardiac evaluation at the [facility] Cardiology office June 4, 2025.      The patients problems are listed as in the impression above.    Electronic medical records  reviewed.    Patient is a pleasant 69-year-old hypertensive, hyperlipidemic gentleman with recent ETEC CT scan of the chest following pneumonia which noted pulmonary hypertension.  Echocardiogram was performed by primary which noted preserved left ventricular function but right ventricular dysfunction and dilated pulmonary artery measuring 3.5 cm.  Primary [place] for further evaluation from a cardiovascular standpoint for cardiac etiology.    The patient overall states that he is well.  He does have obstructive dyspnea on exertion.  He has no other significant symptoms except for some edema.    Patient denies Chest Pain, SOB, Lightheadedness, Dizziness, TIA or CVA symptoms.  No CHF or Edema.  No Palpitations.  No GI,  or Bleeding Issues. No Recent Fever or Chills.     Cardiovascular and general review of systems is otherwise negative.    A 14-system review is otherwise negative, other than noted.    ALLERGIES:     Fenofibrate    MEDICATIONS:     Current Outpatient Medications   Medication Instructions    atorvastatin (LIPITOR) 20 mg, oral, Daily    magnesium 250 mg tablet 2 times daily    multivitamin capsule Take by mouth.    omega 3-dha-epa-fish oil (Fish OiL) 1,000 mg (120 mg-180 mg) capsule Take by mouth.    omeprazole (PRILOSEC) 40 mg, oral, Daily before breakfast, Do not crush or chew.    primidone (MYSOLINE) 50 mg, oral, Nightly       PAST MEDICAL HISTORY:   As per impression above.  No other significant past medical or surgical history appreciated.    SOCIAL HISTORY:   .  Children.  Retired .  Denies tobacco alcohol or illicit drug use currently.  Past smoker.    FAMILY HISTORY:   Question family history of CAD    VITALS:     Vitals:    06/04/25 1534   BP: 138/88   Pulse: 103       Wt Readings from Last 4 Encounters:   06/04/25 134 kg (295 lb)   05/20/25 135 kg (297 lb)   04/28/25 135 kg (297 lb 6.4 oz)   04/22/25 137 kg (301 lb)       PHYSICAL EXAMINATION:      General: No acute distress.  Vital signs as noted. Alert and oriented.  Head And Neck Examination: No jugular venous distention, no carotid bruits, no mass. Carotid upstrokes preserved. Oral mucosa moist.  No xanthelasma. Head and neck examination otherwise unremarkable.  Lungs: Clear to auscultation and percussion. No wheezes, no rales,  and no rhonchi.  Chest: Excursion appeared to be normal. No chest wall tenderness on palpation.  Heart: Normal S1 and S2. No S3. No S4. No rub. Grade 1/6 systolic murmur, best heard at the left sternal border. Point of maximal impulse was within normal limits.  Increased right ventricular heave.  Abdomen: Soft. Nontender. No organomegaly. No bruits. No masses. Obese.  Extremities: Mild bipedal edema. No clubbing. No cyanosis.  Pulses are strong throughout. No bruits.  Musculoskeletal Exam: No ulcers, otherwise unremarkable.  Neuro: Neurologically appeared grossly intact.    ELECTROCARDIOGRAM:      None this visit    CARDIAC TESTING:      Echocardiogram, 4/2025:  Preserved left ventricular function.  Ejection fraction 66 5%.  Right ventricular enlargement and right ventricular hypokinesia.    LABORATORY DATA:      CBC:   Lab Results   Component Value Date    WBC 7.0 04/16/2025    RBC 4.43 (L) 04/16/2025    HGB 13.0 (L) 04/16/2025    HCT 39.3 (L) 04/16/2025     04/16/2025        CMP:    Lab Results   Component Value Date     04/16/2025    K 3.8 04/16/2025     04/16/2025    CO2 28 04/16/2025    BUN 12 04/16/2025    CREATININE 0.84 04/16/2025    GLUCOSE 99 04/16/2025    CALCIUM 8.9 04/16/2025       Magnesium:    Lab Results   Component Value Date    MG 2.09 04/15/2025       Lipid Profile:    Lab Results   Component Value Date    CHOL 180 04/15/2024    TRIG 358 (H) 04/15/2024    HDL 37.0 04/15/2024    LDLCALC 71 04/15/2024       Hepatic Function Panel:    Lab Results   Component Value Date    ALKPHOS 82 04/14/2025    ALT 29 04/14/2025    AST 24 04/14/2025    PROT 7.4 04/14/2025    BILITOT 0.9  04/14/2025     BNP:   Lab Results   Component Value Date    BNP 52 04/14/2025      PT/INR:    Lab Results   Component Value Date    PROTIME CANCELED 07/05/2023    INR CANCELED 07/05/2023       Cardiac Enzymes:    Lab Results   Component Value Date    TROPHS 10 04/14/2025    TROPHS 8 04/14/2025    TROPHS 7 04/12/2025                         PROBLEM LIST:     Problem List[1]          Sherwin Castro MD, FACC   [facility] /  Cardiology      Of Note:  [employer] voice recognition dictation software was utilized partially in the preparation of this note, therefore, inaccuracies in spelling, word choice and punctuation may have occurred which were not recognized at the time of signing.    Patient was seen and examined with total time of visit including chart preparation, rooming, and chart completion exceeding 40 minutes.      IKaren RN, am scribing for, and in the presence of Dr. Sherwin Castro MD, formerly [facility].    I, Dr. Sherwin Castro MD, formerly [facility], personally performed the services described in the documentation as scribed by Karen Cazares RN, in my presence, and confirm it is both accurate and complete.              [1]   Patient Active Problem List  Diagnosis    Calcaneal spur of left foot    ED (erectile dysfunction)    Mixed hyperlipidemia    Hypertriglyceridemia    Incisional hernia, without obstruction or gangrene    Piriformis syndrome, left    Psoriasis, guttate    Tremor, essential    Rectus diastasis    Lumbosacral spondylosis without myelopathy    Sebaceous cyst    Obesity, unspecified    Primary hypertension    Esophageal dysphagia    Gastroesophageal reflux disease without esophagitis    Multiple nevi    Calcaneal spur    Pulmonary hypertension (Multi)

## 2025-06-04 NOTE — PATIENT INSTRUCTIONS
FOLLOW UP WITH Dr. Sherwin Castro MD, [facility] AFTER TESTING     CARDIAC MR, LEXISCAN STRESS TEST, CALCIUM SCORE TEST TO BE SCHEDULED IN THE NEAR FUTURE     DID YOU KNOW  We have a pharmacy here in the [facility].  They can fill all prescriptions, not just cardiac medications.  Prescriptions from other pharmacies can easily be transferred to the  pharmacy by the  pharmacist on site.   pharmacies offer FREE HOME DELIVERY on medications to anywhere in [place]. They can sync your medications. Typically prescriptions can be ready in 10 - 15 minutes. If pharmacy is unable to fill your  prescription or if cost is more than your paying now the Pharmacist can easily transfer back to your Pharmacy of choice. Pharmacy phone # [phone].     Please bring all medicines, vitamins, and herbal supplements with you in original bottles to every appointment! This is the best way to ensure your medication list in your chart is accurate.    Prescriptions will not be filled unless you are compliant with your follow up appointments or have a follow up appointment scheduled as per instruction of your physician. Refills should be requested at the time of your visit.

## 2025-06-25 ENCOUNTER — HOSPITAL ENCOUNTER (OUTPATIENT)
Dept: RESPIRATORY THERAPY | Facility: HOSPITAL | Age: 70
Discharge: HOME | End: 2025-06-25
Payer: MEDICARE

## 2025-06-25 DIAGNOSIS — R06.00 DYSPNEA, UNSPECIFIED TYPE: ICD-10-CM

## 2025-06-25 PROCEDURE — 94618 PULMONARY STRESS TESTING: CPT

## 2025-06-25 PROCEDURE — 2500000002 HC RX 250 W HCPCS SELF ADMINISTERED DRUGS (ALT 637 FOR MEDICARE OP, ALT 636 FOR OP/ED): Performed by: NURSE PRACTITIONER

## 2025-06-25 PROCEDURE — 95012 NITRIC OXIDE EXP GAS DETER: CPT

## 2025-06-25 PROCEDURE — 94640 AIRWAY INHALATION TREATMENT: CPT

## 2025-06-25 PROCEDURE — 94729 DIFFUSING CAPACITY: CPT

## 2025-06-25 RX ORDER — ALBUTEROL SULFATE 0.83 MG/ML
3 SOLUTION RESPIRATORY (INHALATION) ONCE
Status: COMPLETED | OUTPATIENT
Start: 2025-06-25 | End: 2025-06-25

## 2025-06-25 RX ORDER — ALBUTEROL SULFATE 90 UG/1
1 INHALANT RESPIRATORY (INHALATION) ONCE
Status: COMPLETED | OUTPATIENT
Start: 2025-06-25 | End: 2025-06-25

## 2025-06-25 RX ADMIN — ALBUTEROL SULFATE 3 ML: 2.5 SOLUTION RESPIRATORY (INHALATION) at 10:46

## 2025-07-03 LAB
MGC ASCENT PFT - FEV1 - POST: 2.99
MGC ASCENT PFT - FEV1 - POST: 2.99
MGC ASCENT PFT - FEV1 - PRE: 2.92
MGC ASCENT PFT - FEV1 - PRE: 2.92
MGC ASCENT PFT - FEV1 - PREDICTED: 4.07
MGC ASCENT PFT - FEV1 - PREDICTED: 4.07
MGC ASCENT PFT - FVC - POST: 4.11
MGC ASCENT PFT - FVC - POST: 4.11
MGC ASCENT PFT - FVC - PRE: 4.22
MGC ASCENT PFT - FVC - PRE: 4.22
MGC ASCENT PFT - FVC - PREDICTED: 5.57
MGC ASCENT PFT - FVC - PREDICTED: 5.57

## 2025-07-10 ENCOUNTER — HOSPITAL ENCOUNTER (OUTPATIENT)
Dept: RADIOLOGY | Facility: HOSPITAL | Age: 70
Discharge: HOME | End: 2025-07-10
Payer: MEDICARE

## 2025-07-10 DIAGNOSIS — K43.2 INCISIONAL HERNIA, WITHOUT OBSTRUCTION OR GANGRENE: ICD-10-CM

## 2025-07-10 DIAGNOSIS — I51.7 ATRIAL DILATION: ICD-10-CM

## 2025-07-10 DIAGNOSIS — E78.2 MIXED HYPERLIPIDEMIA: ICD-10-CM

## 2025-07-10 DIAGNOSIS — I10 PRIMARY HYPERTENSION: ICD-10-CM

## 2025-07-10 DIAGNOSIS — I51.7 ENLARGED RV (RIGHT VENTRICLE): ICD-10-CM

## 2025-07-10 DIAGNOSIS — R94.39 ABNORMAL RESULT OF OTHER CARDIOVASCULAR FUNCTION STUDY: ICD-10-CM

## 2025-07-10 DIAGNOSIS — R06.02 SHORTNESS OF BREATH: ICD-10-CM

## 2025-07-10 DIAGNOSIS — K40.90 INGUINAL HERNIA WITHOUT OBSTRUCTION OR GANGRENE, RECURRENCE NOT SPECIFIED, UNSPECIFIED LATERALITY: ICD-10-CM

## 2025-07-10 DIAGNOSIS — G25.0 TREMOR, ESSENTIAL: ICD-10-CM

## 2025-07-10 DIAGNOSIS — I27.20 PULMONARY HYPERTENSION (MULTI): ICD-10-CM

## 2025-07-10 DIAGNOSIS — I28.8 DILATATION OF PULMONIC ARTERY (MULTI): ICD-10-CM

## 2025-07-10 DIAGNOSIS — Q67.6 PECTUS EXCAVATUM: ICD-10-CM

## 2025-07-10 DIAGNOSIS — I45.10 UNSPECIFIED RIGHT BUNDLE-BRANCH BLOCK: ICD-10-CM

## 2025-07-10 DIAGNOSIS — I25.10 ATHEROSCLEROSIS OF NATIVE CORONARY ARTERY OF NATIVE HEART WITHOUT ANGINA PECTORIS: ICD-10-CM

## 2025-07-10 DIAGNOSIS — I51.9 DYSFUNCTION OF RIGHT CARDIAC VENTRICLE: ICD-10-CM

## 2025-07-10 PROCEDURE — 75571 CT HRT W/O DYE W/CA TEST: CPT

## 2025-07-14 ENCOUNTER — HOSPITAL ENCOUNTER (OUTPATIENT)
Dept: RADIOLOGY | Facility: HOSPITAL | Age: 70
Discharge: HOME | End: 2025-07-14
Payer: MEDICARE

## 2025-07-14 DIAGNOSIS — R91.8 MULTIPLE PULMONARY NODULES: ICD-10-CM

## 2025-07-14 DIAGNOSIS — Z87.01 HISTORY OF RECENT PNEUMONIA: ICD-10-CM

## 2025-07-14 PROCEDURE — 71250 CT THORAX DX C-: CPT

## 2025-07-14 PROCEDURE — 71250 CT THORAX DX C-: CPT | Performed by: RADIOLOGY

## 2025-07-15 ENCOUNTER — ANCILLARY PROCEDURE (OUTPATIENT)
Dept: CARDIOLOGY | Facility: HOSPITAL | Age: 70
End: 2025-07-15
Payer: MEDICARE

## 2025-07-15 DIAGNOSIS — I10 PRIMARY HYPERTENSION: ICD-10-CM

## 2025-07-15 DIAGNOSIS — Q67.6 PECTUS EXCAVATUM: ICD-10-CM

## 2025-07-15 DIAGNOSIS — I28.8 DILATATION OF PULMONIC ARTERY (MULTI): ICD-10-CM

## 2025-07-15 DIAGNOSIS — I45.10 UNSPECIFIED RIGHT BUNDLE-BRANCH BLOCK: ICD-10-CM

## 2025-07-15 DIAGNOSIS — I51.7 ENLARGED RV (RIGHT VENTRICLE): ICD-10-CM

## 2025-07-15 DIAGNOSIS — I51.7 ATRIAL DILATION: ICD-10-CM

## 2025-07-15 DIAGNOSIS — G25.0 TREMOR, ESSENTIAL: ICD-10-CM

## 2025-07-15 DIAGNOSIS — E78.2 MIXED HYPERLIPIDEMIA: ICD-10-CM

## 2025-07-15 DIAGNOSIS — K43.2 INCISIONAL HERNIA, WITHOUT OBSTRUCTION OR GANGRENE: ICD-10-CM

## 2025-07-15 DIAGNOSIS — R06.02 SHORTNESS OF BREATH: ICD-10-CM

## 2025-07-15 DIAGNOSIS — I51.9 DYSFUNCTION OF RIGHT CARDIAC VENTRICLE: ICD-10-CM

## 2025-07-15 DIAGNOSIS — I25.10 ATHEROSCLEROSIS OF NATIVE CORONARY ARTERY OF NATIVE HEART WITHOUT ANGINA PECTORIS: ICD-10-CM

## 2025-07-15 DIAGNOSIS — I27.20 PULMONARY HYPERTENSION (MULTI): Primary | ICD-10-CM

## 2025-07-15 DIAGNOSIS — K40.90 INGUINAL HERNIA WITHOUT OBSTRUCTION OR GANGRENE, RECURRENCE NOT SPECIFIED, UNSPECIFIED LATERALITY: ICD-10-CM

## 2025-07-15 DIAGNOSIS — R94.39 ABNORMAL RESULT OF OTHER CARDIOVASCULAR FUNCTION STUDY: ICD-10-CM

## 2025-07-15 DIAGNOSIS — I27.20 PULMONARY HYPERTENSION (MULTI): ICD-10-CM

## 2025-07-15 PROCEDURE — 78452 HT MUSCLE IMAGE SPECT MULT: CPT | Performed by: INTERNAL MEDICINE

## 2025-07-15 PROCEDURE — A9502 TC99M TETROFOSMIN: HCPCS | Performed by: INTERNAL MEDICINE

## 2025-07-15 PROCEDURE — 93016 CV STRESS TEST SUPVJ ONLY: CPT | Performed by: INTERNAL MEDICINE

## 2025-07-15 PROCEDURE — 93018 CV STRESS TEST I&R ONLY: CPT | Performed by: INTERNAL MEDICINE

## 2025-07-15 PROCEDURE — 3430000001 HC RX 343 DIAGNOSTIC RADIOPHARMACEUTICALS: Performed by: INTERNAL MEDICINE

## 2025-07-15 PROCEDURE — 2500000004 HC RX 250 GENERAL PHARMACY W/ HCPCS (ALT 636 FOR OP/ED): Performed by: INTERNAL MEDICINE

## 2025-07-15 PROCEDURE — 78452 HT MUSCLE IMAGE SPECT MULT: CPT

## 2025-07-15 RX ORDER — REGADENOSON 0.08 MG/ML
0.4 INJECTION, SOLUTION INTRAVENOUS ONCE
Status: COMPLETED | OUTPATIENT
Start: 2025-07-15 | End: 2025-07-15

## 2025-07-15 RX ADMIN — TETROFOSMIN 10.6 MILLICURIE: 0.23 INJECTION, POWDER, LYOPHILIZED, FOR SOLUTION INTRAVENOUS at 12:10

## 2025-07-15 RX ADMIN — REGADENOSON 0.4 MG: 0.08 INJECTION, SOLUTION INTRAVENOUS at 13:24

## 2025-07-15 RX ADMIN — TETROFOSMIN 36 MILLICURIE: 0.23 INJECTION, POWDER, LYOPHILIZED, FOR SOLUTION INTRAVENOUS at 13:25

## 2025-07-17 ENCOUNTER — HOSPITAL ENCOUNTER (OUTPATIENT)
Dept: RADIOLOGY | Facility: CLINIC | Age: 70
End: 2025-07-17
Payer: MEDICARE

## 2025-07-22 ENCOUNTER — HOSPITAL ENCOUNTER (OUTPATIENT)
Dept: RADIOLOGY | Facility: CLINIC | Age: 70
Discharge: HOME | End: 2025-07-22
Payer: MEDICARE

## 2025-07-22 DIAGNOSIS — R94.39 ABNORMAL RESULT OF OTHER CARDIOVASCULAR FUNCTION STUDY: ICD-10-CM

## 2025-07-22 DIAGNOSIS — G25.0 TREMOR, ESSENTIAL: ICD-10-CM

## 2025-07-22 DIAGNOSIS — K43.2 INCISIONAL HERNIA, WITHOUT OBSTRUCTION OR GANGRENE: ICD-10-CM

## 2025-07-22 DIAGNOSIS — I51.7 ATRIAL DILATION: ICD-10-CM

## 2025-07-22 DIAGNOSIS — K40.90 INGUINAL HERNIA WITHOUT OBSTRUCTION OR GANGRENE, RECURRENCE NOT SPECIFIED, UNSPECIFIED LATERALITY: ICD-10-CM

## 2025-07-22 DIAGNOSIS — E78.2 MIXED HYPERLIPIDEMIA: ICD-10-CM

## 2025-07-22 DIAGNOSIS — I51.9 DYSFUNCTION OF RIGHT CARDIAC VENTRICLE: ICD-10-CM

## 2025-07-22 DIAGNOSIS — Q67.6 PECTUS EXCAVATUM: ICD-10-CM

## 2025-07-22 DIAGNOSIS — I27.20 PULMONARY HYPERTENSION (MULTI): ICD-10-CM

## 2025-07-22 DIAGNOSIS — I10 PRIMARY HYPERTENSION: ICD-10-CM

## 2025-07-22 DIAGNOSIS — R06.02 SHORTNESS OF BREATH: ICD-10-CM

## 2025-07-22 DIAGNOSIS — I25.10 ATHEROSCLEROSIS OF NATIVE CORONARY ARTERY OF NATIVE HEART WITHOUT ANGINA PECTORIS: ICD-10-CM

## 2025-07-22 DIAGNOSIS — I51.7 ENLARGED RV (RIGHT VENTRICLE): ICD-10-CM

## 2025-07-22 DIAGNOSIS — I28.8 DILATATION OF PULMONIC ARTERY (MULTI): ICD-10-CM

## 2025-07-22 DIAGNOSIS — I45.10 UNSPECIFIED RIGHT BUNDLE-BRANCH BLOCK: ICD-10-CM

## 2025-07-22 PROCEDURE — 2550000001 HC RX 255 CONTRASTS: Performed by: INTERNAL MEDICINE

## 2025-07-22 PROCEDURE — 71555 MRI ANGIO CHEST W OR W/O DYE: CPT

## 2025-07-22 PROCEDURE — 75565 CARD MRI VELOC FLOW MAPPING: CPT | Performed by: INTERNAL MEDICINE

## 2025-07-22 PROCEDURE — A9575 INJ GADOTERATE MEGLUMI 0.1ML: HCPCS | Performed by: INTERNAL MEDICINE

## 2025-07-22 PROCEDURE — 75561 CARDIAC MRI FOR MORPH W/DYE: CPT | Performed by: INTERNAL MEDICINE

## 2025-07-22 PROCEDURE — 71555 MRI ANGIO CHEST W OR W/O DYE: CPT | Performed by: INTERNAL MEDICINE

## 2025-07-22 RX ORDER — GADOTERATE MEGLUMINE 376.9 MG/ML
40 INJECTION INTRAVENOUS
Status: COMPLETED | OUTPATIENT
Start: 2025-07-22 | End: 2025-07-22

## 2025-07-22 RX ADMIN — GADOTERATE MEGLUMINE 40 ML: 376.9 INJECTION INTRAVENOUS at 13:05

## 2025-07-23 ENCOUNTER — APPOINTMENT (OUTPATIENT)
Dept: CARDIOLOGY | Facility: CLINIC | Age: 70
End: 2025-07-23
Payer: MEDICARE

## 2025-07-23 VITALS
HEIGHT: 78 IN | SYSTOLIC BLOOD PRESSURE: 126 MMHG | WEIGHT: 302.4 LBS | HEART RATE: 93 BPM | BODY MASS INDEX: 34.99 KG/M2 | DIASTOLIC BLOOD PRESSURE: 78 MMHG

## 2025-07-23 DIAGNOSIS — I51.7 ENLARGED RV (RIGHT VENTRICLE): ICD-10-CM

## 2025-07-23 DIAGNOSIS — I51.7 ATRIAL DILATION: ICD-10-CM

## 2025-07-23 DIAGNOSIS — K43.2 INCISIONAL HERNIA, WITHOUT OBSTRUCTION OR GANGRENE: ICD-10-CM

## 2025-07-23 DIAGNOSIS — G25.0 TREMOR, ESSENTIAL: ICD-10-CM

## 2025-07-23 DIAGNOSIS — R06.02 SHORTNESS OF BREATH: ICD-10-CM

## 2025-07-23 DIAGNOSIS — I51.9 DYSFUNCTION OF RIGHT CARDIAC VENTRICLE: ICD-10-CM

## 2025-07-23 DIAGNOSIS — E78.2 MIXED HYPERLIPIDEMIA: ICD-10-CM

## 2025-07-23 DIAGNOSIS — I25.10 ATHEROSCLEROSIS OF NATIVE CORONARY ARTERY OF NATIVE HEART WITHOUT ANGINA PECTORIS: ICD-10-CM

## 2025-07-23 DIAGNOSIS — E78.1 HYPERTRIGLYCERIDEMIA: ICD-10-CM

## 2025-07-23 DIAGNOSIS — K40.90 INGUINAL HERNIA WITHOUT OBSTRUCTION OR GANGRENE, RECURRENCE NOT SPECIFIED, UNSPECIFIED LATERALITY: ICD-10-CM

## 2025-07-23 DIAGNOSIS — I10 PRIMARY HYPERTENSION: ICD-10-CM

## 2025-07-23 DIAGNOSIS — R94.39 ABNORMAL RESULT OF OTHER CARDIOVASCULAR FUNCTION STUDY: ICD-10-CM

## 2025-07-23 DIAGNOSIS — I27.20 PULMONARY HYPERTENSION (MULTI): ICD-10-CM

## 2025-07-23 DIAGNOSIS — I45.10 UNSPECIFIED RIGHT BUNDLE-BRANCH BLOCK: ICD-10-CM

## 2025-07-23 DIAGNOSIS — Q67.6 PECTUS EXCAVATUM: ICD-10-CM

## 2025-07-23 DIAGNOSIS — R93.1 ELEVATED CORONARY ARTERY CALCIUM SCORE: Primary | ICD-10-CM

## 2025-07-23 DIAGNOSIS — I28.8 DILATATION OF PULMONIC ARTERY (MULTI): ICD-10-CM

## 2025-07-23 PROCEDURE — 99214 OFFICE O/P EST MOD 30 MIN: CPT | Performed by: INTERNAL MEDICINE

## 2025-07-23 PROCEDURE — 3078F DIAST BP <80 MM HG: CPT | Performed by: INTERNAL MEDICINE

## 2025-07-23 PROCEDURE — 3008F BODY MASS INDEX DOCD: CPT | Performed by: INTERNAL MEDICINE

## 2025-07-23 PROCEDURE — 1159F MED LIST DOCD IN RCRD: CPT | Performed by: INTERNAL MEDICINE

## 2025-07-23 PROCEDURE — 3074F SYST BP LT 130 MM HG: CPT | Performed by: INTERNAL MEDICINE

## 2025-07-23 NOTE — PROGRESS NOTES
CARDIOLOGY OFFICE NOTE     Date:   7/23/2025    Patient:    Bandar Castellanos    YOB: 1955    Primary Physician: René Mckenna MD         REASON FOR VISIT / CHIEF COMPLAINT:     Patient returns post testing results follow-up    HPI:     Bandar Castellanos was seen in cardiac evaluation at the [facility] Cardiology office July 23, 2025.      The patients problems are listed as in the impression below.    Electronic medical records reviewed.    Patient returns.  Had testing.  These are outlined below in detail.  CT calcium scoring was positive for 82 suggesting coronary artery disease however his Lexiscan perfusion study was negative suggesting no significant obstructive disease at this time.  Cardiac MRI is still pending.    He does have some lower extremity edema at times.  He still has some snoring and palpitations and dyspnea on exertion.  Pulmonary function studies were positive for mild restrictive disease but no asthma.  DLCO was normal.  He has a follow-up appointment with nephrology next month.    Patient denies Chest Pain, Lightheadedness, Dizziness, TIA or CVA symptoms.  No CHF.   No GI,  or Bleeding Issues. No Recent Fever or Chills.     Cardiovascular and general review of systems is otherwise negative.    A 14-system review is otherwise negative, other than noted.     PHYSICAL EXAMINATION:      Vitals:    07/23/25 1244   BP: 126/78   Pulse: 93     General: No acute distress. Vital signs as noted. Alert and oriented.  Head And Neck Examination: No jugular venous distention, no carotid bruits, no mass. Carotid upstrokes preserved. Oral mucosa moist.  No xanthelasma. Head and neck examination otherwise unremarkable.  Lungs: Clear to auscultation and percussion. No wheezes, no rales,  and no rhonchi.  Chest: Excursion appeared to be normal. No chest wall tenderness on palpation.  Heart: Normal S1 and S2. No S3. No S4. No rub. Grade 1/6 systolic murmur, best heard at the left sternal  DATE:  10/26/2020   TIME OF RECEIPT FROM LAB:  0600  LAB TEST:  troponin  LAB VALUE:  8.241  RESULTS GIVEN WITH READ-BACK TO (PROVIDER):  Dr. Hotl (via text)  TIME LAB VALUE REPORTED TO PROVIDER:   0604     border. Point of maximal impulse was within normal limits.  Increased right ventricular heave.  Abdomen: Soft. Nontender. No organomegaly. No bruits. No masses. Obese.  Extremities: Mild bipedal edema. No clubbing. No cyanosis.  Pulses are strong throughout. No bruits.  Musculoskeletal Exam: No ulcers, otherwise unremarkable.  Neuro: Neurologically appeared grossly intact.  .  IMPRESSION:      Dyspnea on exertion  Palpitations  Snoring  Edema  Pneumonia 4/2025  Pulmonary hypertension with increased pulmonary artery by CT  Right right ventricular enlargement, echocardiogram, 4/2025.  Preserved left ventricular function, LVEF 60 to 65%, echocardiogram, 4/2025.  Coronary calcifications, CT, 4/ 2025, positive CT calcium score 8/4/1982, 7/2025.  Negative Lexiscan perfusion stress test, 7/2025  Negative pulmonary function studies for significant disease 7/2025.  Abnormal rest electrocardiogram  Tachycardia  Right bundle branch block  Hypertension  Hyperlipidemia  GERD  Tremors  Lumbar disc disease anemia  Incisional hernia  Past tobacco abuse  Otherwise as per assessment below.    RECOMMENDATIONS:      Patient overall continues to do well.  He was reassured.  He has the above-noted findings.    Would suggest continuing his current medication.  Refills were provided.    He will follow-up with primary care and pulmonary medicine for further recommendations.    I encouraged him to follow an exercise dietary program with weight loss and mind conditioning.  Suggested to join [employer] and the gym.    [employer] portal use was encouraged.    We will plan to see back in 6 months with Laboratory Studies and ECG as ordered.     Patient will follow up with their primary physician for general care.    The patient knows to contact medical care earlier if need be.      ALLERGIES:     Fenofibrate     MEDICATIONS:     Current Outpatient Medications   Medication Instructions    atorvastatin (LIPITOR) 20 mg, oral, Daily    magnesium  250 mg tablet 2 times daily    multivitamin capsule Take by mouth.    omega 3-dha-epa-fish oil (Fish OiL) 1,000 mg (120 mg-180 mg) capsule Take by mouth.    omeprazole (PRILOSEC) 40 mg, oral, Daily before breakfast, Do not crush or chew.    primidone (MYSOLINE) 50 mg, oral, Nightly    verapamil (CALAN) 120 mg, oral, Daily       ELECTROCARDIOGRAM:      None this visit    CARDIAC TESTING:      Lexiscan perfusion stress test, 7/2025:  Negative study.  No significant coronary artery disease  LVEF 60%    CT calcium score, 7/2025:   Elevated CT calcium score of 422.  Ascending aorta measured 38 mm.    Echocardiogram, 4/2025:  Preserved left ventricular function.  Ejection fraction 66 5%.  Right ventricular enlargement and right ventricular hypokinesia.    LABORATORY DATA:      CBC:   Lab Results   Component Value Date    WBC 7.0 04/16/2025    RBC 4.43 (L) 04/16/2025    HGB 13.0 (L) 04/16/2025    HCT 39.3 (L) 04/16/2025     04/16/2025        CMP:    Lab Results   Component Value Date     04/16/2025    K 3.8 04/16/2025     04/16/2025    CO2 28 04/16/2025    BUN 12 04/16/2025    CREATININE 0.84 04/16/2025    GLUCOSE 99 04/16/2025    CALCIUM 8.9 04/16/2025       Magnesium:    Lab Results   Component Value Date    MG 2.09 04/15/2025       Lipid Profile:    Lab Results   Component Value Date    CHOL 180 04/15/2024    TRIG 358 (H) 04/15/2024    HDL 37.0 04/15/2024    LDLCALC 71 04/15/2024       Hepatic Function Panel:    Lab Results   Component Value Date    ALKPHOS 82 04/14/2025    ALT 29 04/14/2025    AST 24 04/14/2025    PROT 7.4 04/14/2025    BILITOT 0.9 04/14/2025     BNP:   Lab Results   Component Value Date    BNP 52 04/14/2025              PROBLEM LIST:     Problem List[1]          Sherwin Castro MD, [facility] /  Cardiology      Of Note:  [employer] voice recognition dictation software was utilized partially in the preparation of this note, therefore, inaccuracies in spelling, word choice and  punctuation may have occurred which were not recognized at the time of signing.    Patient was seen and examined with total time of visit including chart preparation, rooming, and chart completion exceeding 40 minutes.    Scribe Attestation  By signing my name below, I, Eve Campbell MA , Scribe attest that this documentation has been prepared under the direction and in the presence of Sherwin Castro MD,[facility].     I, Dr. Sherwin Castro MD, [facility], personally performed the services described in the documentation as scribed by Eve Campbell MA in my presence, and confirm it is both accurate and complete.                   [1]   Patient Active Problem List  Diagnosis    Calcaneal spur of left foot    ED (erectile dysfunction)    Mixed hyperlipidemia    Hypertriglyceridemia    Incisional hernia, without obstruction or gangrene    Piriformis syndrome, left    Psoriasis, guttate    Tremor, essential    Rectus diastasis    Lumbosacral spondylosis without myelopathy    Sebaceous cyst    Obesity, unspecified    Primary hypertension    Esophageal dysphagia    Gastroesophageal reflux disease without esophagitis    Multiple nevi    Calcaneal spur    Pulmonary hypertension (Multi)    Dysfunction of right cardiac ventricle    Inguinal hernia without obstruction or gangrene    Atherosclerosis of native coronary artery of native heart without angina pectoris    Pectus excavatum    Syncope    Shortness of breath    Dilatation of pulmonic artery (Multi)    Unspecified right bundle-branch block    Abnormal result of other cardiovascular function study

## 2025-07-23 NOTE — PATIENT INSTRUCTIONS
Dr. Castro would like you to watch your diet, exercise and hydrate   FASTING LABS 1 WEEK PRIOR TO NEXT OFFICE VISIT   CALL DR. CASTRO NEXT WEEK REGARDING CARDIAC MR  ASK YOUR PCP ABOUT GLP-1 MEDICATIONS     DID YOU KNOW  We have a pharmacy here in the [facility].  They can fill all prescriptions, not just cardiac medications.  Prescriptions from other pharmacies can easily be transferred to the  pharmacy by the  pharmacist on site.   pharmacies offer FREE HOME DELIVERY on medications to anywhere in [place]. They can sync your medications. Typically prescriptions can be ready in 10 - 15 minutes. If pharmacy is unable to fill your  prescription or if cost is more than your paying now the Pharmacist can easily transfer back to your Pharmacy of choice. Pharmacy phone # [phone].      Please bring all medicines, vitamins, and herbal supplements with you in original bottles to every appointment  Prescriptions will not be filled unless you are compliant with your follow up appointments or have a follow up appointment scheduled as per instruction of your physician.   Refills should be requested at the time of your visit.

## 2025-07-24 ENCOUNTER — APPOINTMENT (OUTPATIENT)
Dept: SLEEP MEDICINE | Facility: CLINIC | Age: 70
End: 2025-07-24
Payer: MEDICARE

## 2025-07-28 ENCOUNTER — CLINICAL SUPPORT (OUTPATIENT)
Dept: SLEEP MEDICINE | Facility: HOSPITAL | Age: 70
End: 2025-07-28
Payer: MEDICARE

## 2025-07-28 ENCOUNTER — APPOINTMENT (OUTPATIENT)
Dept: SLEEP MEDICINE | Facility: CLINIC | Age: 70
End: 2025-07-28
Payer: MEDICARE

## 2025-07-28 VITALS — BODY MASS INDEX: 34.95 KG/M2 | WEIGHT: 302.03 LBS | HEIGHT: 78 IN

## 2025-07-28 DIAGNOSIS — G47.33 OBSTRUCTIVE SLEEP APNEA (ADULT) (PEDIATRIC): ICD-10-CM

## 2025-07-28 DIAGNOSIS — Z87.01 HISTORY OF RECENT PNEUMONIA: ICD-10-CM

## 2025-07-28 DIAGNOSIS — R06.83 SNORING: ICD-10-CM

## 2025-07-28 DIAGNOSIS — G47.34 NOCTURNAL HYPOXIA: ICD-10-CM

## 2025-07-28 PROCEDURE — 95810 POLYSOM 6/> YRS 4/> PARAM: CPT | Performed by: SPECIALIST

## 2025-07-28 ASSESSMENT — SLEEP AND FATIGUE QUESTIONNAIRES
HOW LIKELY ARE YOU TO NOD OFF OR FALL ASLEEP WHILE SITTING QUIETLY AFTER LUNCH WITHOUT ALCOHOL: WOULD NEVER DOZE
HOW LIKELY ARE YOU TO NOD OFF OR FALL ASLEEP WHILE LYING DOWN TO REST IN THE AFTERNOON WHEN CIRCUMSTANCES PERMIT: MODERATE CHANCE OF DOZING
HOW LIKELY ARE YOU TO NOD OFF OR FALL ASLEEP WHILE WATCHING TV: SLIGHT CHANCE OF DOZING
HOW LIKELY ARE YOU TO NOD OFF OR FALL ASLEEP WHILE SITTING AND TALKING TO SOMEONE: WOULD NEVER DOZE
HOW LIKELY ARE YOU TO NOD OFF OR FALL ASLEEP WHEN YOU ARE A PASSENGER IN A CAR FOR AN HOUR WITHOUT A BREAK: SLIGHT CHANCE OF DOZING
SITING INACTIVE IN A PUBLIC PLACE LIKE A CLASS ROOM OR A MOVIE THEATER: WOULD NEVER DOZE
ESS-CHAD TOTAL SCORE: 5
HOW LIKELY ARE YOU TO NOD OFF OR FALL ASLEEP IN A CAR, WHILE STOPPED FOR A FEW MINUTES IN TRAFFIC: WOULD NEVER DOZE
HOW LIKELY ARE YOU TO NOD OFF OR FALL ASLEEP WHILE SITTING AND READING: SLIGHT CHANCE OF DOZING

## 2025-07-29 NOTE — PROGRESS NOTES
TECHNOLOGIST'S IMPRESSION SHEET    Bandar Gallagher at the sleep center at 7:45 P.M.  He has presented himself to be tested via a routine Split-Night Sleep Study.      Bandar was put to bed at 10:54P.M.   Occassional loud snoring could be heard while he slept.    Although Bandar was scheduled to be tested via a routine Split-Night Sleep Study, due to lack of events in the allotted amount of time, he remained a routine Diagnostic PSG during his overnight stay at the sleep center.      [facility] Machine NOTE:         Patient: Bandar Castellanos   MRN//AGE: [id number]  1955  69 y.o.   Technologist: Nirmala Valenzuela   Room: 405   Service Date: 2025        Sleep Testing Location: [facility]:     TECHNOLOGIST SLEEP STUDY PROCEDURE NOTE:   This sleep study is being conducted according to the policies and procedures outlined by the AAS accreditation standards.  The sleep study procedure and processes involved during this appointment was explained to the patient, questions were answered. The patient verbalized understanding.      The patient is a 69 y.o. year old male scheduled for aDiagnostic PSG Split night.   He arrived for his appointment at 7:45 P.M.      The study that was ultimately completed was a Routine Diagnostic PSG Sleep Study.    The full study Was completed.  Patient questionnaires completed?: yes     Consents signed? yes    Initial Fall Risk Screening:     Bandar has not fallen in the last 6 months. His did not result in injury. Bandar does not have a fear of falling. He does not need assistance with sitting, standing, or walking. He does not need assistance walking in his home. He does not need assistance in an unfamiliar setting. The patient is not using an assistive device.     Brief Study observations: The patient remained a routine Diagnostic PSG Sleep Study during her overnight stay at the sleep center.     Deviation to order/protocol and reason: Did not split as requested via the referring  physician.      If PAP, which was preferred mask/pressure/mode: NA      Other:None    After the procedure, the patient was informed to ensure followup with ordering clinician for testing results.      Technologist: Nirmala Valenzuela

## 2025-08-05 ENCOUNTER — TELEPHONE (OUTPATIENT)
Dept: CARDIOLOGY | Facility: CLINIC | Age: 70
End: 2025-08-05
Payer: MEDICARE

## 2025-08-05 NOTE — TELEPHONE ENCOUNTER
RN received phone call from patient stating that he was in to see Dr. Sherwin Castro MD, FACC the day after his Cardiac MRI,  however the results were not finalized at that time.  He is requesting results at this time.      He also states that on 06/04/25 Dr. Sherwin Castro MD, FACC requested that he start taking Verapamil 120mg one tablet daily.  He is asking if he still needs to continue taking this medication.  He denies taking his blood pressures at home.     Message routed to Dr. Sherwin Castro MD, FACC to review and advise.     Karen Cazares RN

## 2025-08-08 NOTE — TELEPHONE ENCOUNTER
RN received VM from patient stating that he was returning our call.  RN attempted to reach patient again, however, he was unavailable.      RN left detailed message on patients identified VM advising him of Dr. Sherwin Castro MD, [facility] response to message.  Advised patient to call back with any of further questions or concerns.       Karen Cazares RN

## 2025-08-21 ENCOUNTER — APPOINTMENT (OUTPATIENT)
Facility: CLINIC | Age: 70
End: 2025-08-21
Payer: MEDICARE

## 2025-08-27 ENCOUNTER — TELEPHONE (OUTPATIENT)
Facility: CLINIC | Age: 70
End: 2025-08-27
Payer: MEDICARE

## 2025-09-09 ENCOUNTER — APPOINTMENT (OUTPATIENT)
Dept: PRIMARY CARE | Facility: CLINIC | Age: 70
End: 2025-09-09
Payer: MEDICARE

## 2025-09-16 ENCOUNTER — APPOINTMENT (OUTPATIENT)
Facility: CLINIC | Age: 70
End: 2025-09-16
Payer: MEDICARE

## 2025-10-22 ENCOUNTER — APPOINTMENT (OUTPATIENT)
Dept: PRIMARY CARE | Facility: CLINIC | Age: 70
End: 2025-10-22
Payer: MEDICARE

## 2025-12-01 ENCOUNTER — APPOINTMENT (OUTPATIENT)
Dept: PRIMARY CARE | Facility: CLINIC | Age: 70
End: 2025-12-01
Payer: MEDICARE

## 2026-01-21 ENCOUNTER — APPOINTMENT (OUTPATIENT)
Dept: CARDIOLOGY | Facility: CLINIC | Age: 71
End: 2026-01-21
Payer: MEDICARE